# Patient Record
Sex: FEMALE | Race: BLACK OR AFRICAN AMERICAN | NOT HISPANIC OR LATINO
[De-identification: names, ages, dates, MRNs, and addresses within clinical notes are randomized per-mention and may not be internally consistent; named-entity substitution may affect disease eponyms.]

---

## 2023-11-17 ENCOUNTER — ASOB RESULT (OUTPATIENT)
Age: 33
End: 2023-11-17

## 2023-11-17 ENCOUNTER — LABORATORY RESULT (OUTPATIENT)
Age: 33
End: 2023-11-17

## 2023-11-17 ENCOUNTER — APPOINTMENT (OUTPATIENT)
Dept: ANTEPARTUM | Facility: CLINIC | Age: 33
End: 2023-11-17
Payer: COMMERCIAL

## 2023-11-17 PROCEDURE — 36416 COLLJ CAPILLARY BLOOD SPEC: CPT

## 2023-11-17 PROCEDURE — 76801 OB US < 14 WKS SINGLE FETUS: CPT | Mod: 59

## 2023-11-17 PROCEDURE — 76813 OB US NUCHAL MEAS 1 GEST: CPT

## 2024-01-12 ENCOUNTER — ASOB RESULT (OUTPATIENT)
Age: 34
End: 2024-01-12

## 2024-01-12 ENCOUNTER — APPOINTMENT (OUTPATIENT)
Dept: ANTEPARTUM | Facility: CLINIC | Age: 34
End: 2024-01-12
Payer: COMMERCIAL

## 2024-01-12 PROCEDURE — 76811 OB US DETAILED SNGL FETUS: CPT

## 2024-01-13 ENCOUNTER — TRANSCRIPTION ENCOUNTER (OUTPATIENT)
Age: 34
End: 2024-01-13

## 2024-01-18 ENCOUNTER — NON-APPOINTMENT (OUTPATIENT)
Age: 34
End: 2024-01-18

## 2024-05-03 ENCOUNTER — ASOB RESULT (OUTPATIENT)
Age: 34
End: 2024-05-03

## 2024-05-03 ENCOUNTER — APPOINTMENT (OUTPATIENT)
Dept: MATERNAL FETAL MEDICINE | Facility: CLINIC | Age: 34
End: 2024-05-03
Payer: COMMERCIAL

## 2024-05-03 PROCEDURE — G0109 DIAB MANAGE TRN IND/GROUP: CPT | Mod: 95

## 2024-05-03 RX ORDER — URINE ACETONE TEST STRIPS
STRIP MISCELLANEOUS
Qty: 1 | Refills: 2 | Status: ACTIVE | COMMUNITY
Start: 2024-05-03 | End: 1900-01-01

## 2024-05-13 ENCOUNTER — APPOINTMENT (OUTPATIENT)
Dept: MATERNAL FETAL MEDICINE | Facility: CLINIC | Age: 34
End: 2024-05-13
Payer: COMMERCIAL

## 2024-05-13 ENCOUNTER — ASOB RESULT (OUTPATIENT)
Age: 34
End: 2024-05-13

## 2024-05-13 PROCEDURE — G0108 DIAB MANAGE TRN  PER INDIV: CPT | Mod: 95

## 2024-05-19 ENCOUNTER — INPATIENT (INPATIENT)
Facility: HOSPITAL | Age: 34
LOS: 4 days | Discharge: HOME | End: 2024-05-24
Attending: STUDENT IN AN ORGANIZED HEALTH CARE EDUCATION/TRAINING PROGRAM | Admitting: STUDENT IN AN ORGANIZED HEALTH CARE EDUCATION/TRAINING PROGRAM

## 2024-05-19 VITALS — DIASTOLIC BLOOD PRESSURE: 81 MMHG | SYSTOLIC BLOOD PRESSURE: 135 MMHG | HEART RATE: 81 BPM

## 2024-05-19 DIAGNOSIS — O24.419 GESTATIONAL DIABETES MELLITUS IN PREGNANCY, UNSPECIFIED CONTROL: ICD-10-CM

## 2024-05-19 DIAGNOSIS — Z98.890 OTHER SPECIFIED POSTPROCEDURAL STATES: Chronic | ICD-10-CM

## 2024-05-19 LAB
BASOPHILS # BLD AUTO: 0.01 K/UL — SIGNIFICANT CHANGE UP (ref 0–0.2)
BASOPHILS NFR BLD AUTO: 0.1 % — SIGNIFICANT CHANGE UP (ref 0–2)
BLD GP AB SCN SERPL QL: NEGATIVE — SIGNIFICANT CHANGE UP
EOSINOPHIL # BLD AUTO: 0.08 K/UL — SIGNIFICANT CHANGE UP (ref 0–0.5)
EOSINOPHIL NFR BLD AUTO: 1.2 % — SIGNIFICANT CHANGE UP (ref 0–6)
GLUCOSE BLDC GLUCOMTR-MCNC: 92 MG/DL — SIGNIFICANT CHANGE UP (ref 70–99)
GLUCOSE BLDC GLUCOMTR-MCNC: 95 MG/DL — SIGNIFICANT CHANGE UP (ref 70–99)
HCT VFR BLD CALC: 37.8 % — SIGNIFICANT CHANGE UP (ref 34.5–45)
HGB BLD-MCNC: 13.1 G/DL — SIGNIFICANT CHANGE UP (ref 11.5–15.5)
IANC: 4.41 K/UL — SIGNIFICANT CHANGE UP (ref 1.8–7.4)
IMM GRANULOCYTES NFR BLD AUTO: 0.6 % — SIGNIFICANT CHANGE UP (ref 0–0.9)
LYMPHOCYTES # BLD AUTO: 1.61 K/UL — SIGNIFICANT CHANGE UP (ref 1–3.3)
LYMPHOCYTES # BLD AUTO: 23.9 % — SIGNIFICANT CHANGE UP (ref 13–44)
MCHC RBC-ENTMCNC: 30.1 PG — SIGNIFICANT CHANGE UP (ref 27–34)
MCHC RBC-ENTMCNC: 34.7 GM/DL — SIGNIFICANT CHANGE UP (ref 32–36)
MCV RBC AUTO: 86.9 FL — SIGNIFICANT CHANGE UP (ref 80–100)
MONOCYTES # BLD AUTO: 0.59 K/UL — SIGNIFICANT CHANGE UP (ref 0–0.9)
MONOCYTES NFR BLD AUTO: 8.8 % — SIGNIFICANT CHANGE UP (ref 2–14)
NEUTROPHILS # BLD AUTO: 4.41 K/UL — SIGNIFICANT CHANGE UP (ref 1.8–7.4)
NEUTROPHILS NFR BLD AUTO: 65.4 % — SIGNIFICANT CHANGE UP (ref 43–77)
NRBC # BLD: 0 /100 WBCS — SIGNIFICANT CHANGE UP (ref 0–0)
NRBC # FLD: 0.02 K/UL — HIGH (ref 0–0)
PLATELET # BLD AUTO: 222 K/UL — SIGNIFICANT CHANGE UP (ref 150–400)
RBC # BLD: 4.35 M/UL — SIGNIFICANT CHANGE UP (ref 3.8–5.2)
RBC # FLD: 13.4 % — SIGNIFICANT CHANGE UP (ref 10.3–14.5)
RH IG SCN BLD-IMP: POSITIVE — SIGNIFICANT CHANGE UP
RH IG SCN BLD-IMP: POSITIVE — SIGNIFICANT CHANGE UP
WBC # BLD: 6.74 K/UL — SIGNIFICANT CHANGE UP (ref 3.8–10.5)
WBC # FLD AUTO: 6.74 K/UL — SIGNIFICANT CHANGE UP (ref 3.8–10.5)

## 2024-05-19 RX ORDER — CHLORHEXIDINE GLUCONATE 213 G/1000ML
1 SOLUTION TOPICAL DAILY
Refills: 0 | Status: DISCONTINUED | OUTPATIENT
Start: 2024-05-19 | End: 2024-05-21

## 2024-05-19 RX ORDER — SODIUM CHLORIDE 9 MG/ML
1000 INJECTION INTRAMUSCULAR; INTRAVENOUS; SUBCUTANEOUS
Refills: 0 | Status: DISCONTINUED | OUTPATIENT
Start: 2024-05-19 | End: 2024-05-21

## 2024-05-19 RX ORDER — MORPHINE SULFATE 50 MG/1
4 CAPSULE, EXTENDED RELEASE ORAL ONCE
Refills: 0 | Status: DISCONTINUED | OUTPATIENT
Start: 2024-05-19 | End: 2024-05-20

## 2024-05-19 RX ORDER — ACETAMINOPHEN 500 MG
1000 TABLET ORAL ONCE
Refills: 0 | Status: COMPLETED | OUTPATIENT
Start: 2024-05-19 | End: 2024-05-19

## 2024-05-19 RX ORDER — OXYTOCIN 10 UNIT/ML
333.33 VIAL (ML) INJECTION
Qty: 20 | Refills: 0 | Status: DISCONTINUED | OUTPATIENT
Start: 2024-05-19 | End: 2024-05-21

## 2024-05-19 RX ORDER — SODIUM CHLORIDE 9 MG/ML
1000 INJECTION, SOLUTION INTRAVENOUS
Refills: 0 | Status: DISCONTINUED | OUTPATIENT
Start: 2024-05-19 | End: 2024-05-21

## 2024-05-19 RX ORDER — CITRIC ACID/SODIUM CITRATE 300-500 MG
15 SOLUTION, ORAL ORAL EVERY 6 HOURS
Refills: 0 | Status: DISCONTINUED | OUTPATIENT
Start: 2024-05-19 | End: 2024-05-21

## 2024-05-19 RX ADMIN — SODIUM CHLORIDE 125 MILLILITER(S): 9 INJECTION INTRAMUSCULAR; INTRAVENOUS; SUBCUTANEOUS at 20:23

## 2024-05-19 RX ADMIN — SODIUM CHLORIDE 125 MILLILITER(S): 9 INJECTION, SOLUTION INTRAVENOUS at 19:01

## 2024-05-19 RX ADMIN — CHLORHEXIDINE GLUCONATE 1 APPLICATION(S): 213 SOLUTION TOPICAL at 19:01

## 2024-05-19 RX ADMIN — Medication 1000 MILLIGRAM(S): at 22:05

## 2024-05-19 RX ADMIN — Medication 400 MILLIGRAM(S): at 21:33

## 2024-05-19 RX ADMIN — SODIUM CHLORIDE 125 MILLILITER(S): 9 INJECTION, SOLUTION INTRAVENOUS at 20:23

## 2024-05-19 NOTE — OB PROVIDER H&P - NSLOWPPHRISK_OBGYN_A_OB
No previous uterine incision/Gabriel Pregnancy/Less than or equal to 4 previous vaginal births/No known bleeding disorder/No history of postpartum hemorrhage

## 2024-05-19 NOTE — OB PROVIDER H&P - ASSESSMENT
A&P:   #Induction of labor:  - Admit to L&D  - Buccal cytotec / cervical balloon  - routine labs  - EFM/toco  - Clear liquids, IV hydration, rotating fluids, finger sticks per protocol  - Consents signed with the patient. Discussed induction/augmentation of labor, vaginal delivery, possible operative delivery with vacuum or foreceps, possible episiotomy, possible  section. Patient expressed understanding of all the risks and benefits, and signed consent form.  - Accepts blood  Fetal: cat 1 tracing, fetal status reassuring  GBS: neg  Analgesia: epiPRN      Discussed with Dr. Suyapa Adams PGY-1

## 2024-05-19 NOTE — OB RN PATIENT PROFILE - HEIGHT IN INCHES
Care Coordination Assessment    PCP: Anna Reyes    Referral Source:  ED/IP List      Clinical Data: Patient discharged from inpatient at [facility] 06/26/2017 for Delusional Disorder.  Patient was discharged home with instructions to follow up with PCP in 7 days.    Plan: I will forward to Denisa in clinical support to assess and assist with appropriate follow up.  We have not seen this patient in 2 years and should schedule a CPX as well           2

## 2024-05-19 NOTE — OB PROVIDER H&P - HISTORY OF PRESENT ILLNESS
R1 H&P    Pt is a 32y/o  at 39+0 admitted for IOL for GDMA1. +FM, -LOF, -VB, -ctx  Patient presented for scheduled IOL for GDMA1. Her sugars were well controlled with diet during the pregnancy, and the GDMA1 was diagnosed later in the pregnancy. She is otherwise uncomplicated. Today, she denies CP, SOB, N/V, RUQ pain, fevers, chills, dysuria, diarrhea.  GBS negative  EFW 3850    OBHx:   GynHx: denies h/o abnormal paps, STI's, cysts.  +Fibroids: DOMINIC IM 3h1u3cm, Posterior R IM 3.2d3i6ej, Fundal R IM 3m4y4zw  PMHx: denies  PSHx: bunionectomy  Med: PNV, ASA, Fe, Zyrtec  All: NKDA  SH: denies alcohol, tobacco, or drug use  Psych: denies h/o anxiety or depression  Accepts blood

## 2024-05-19 NOTE — OB RN PATIENT PROFILE - FUNCTIONAL ASSESSMENT - BASIC MOBILITY 6.
4-calculated by average/Not able to assess (calculate score using [facility] averaging method)

## 2024-05-19 NOTE — OB PROVIDER H&P - NSHPPHYSICALEXAM_GEN_ALL_CORE
EFH: 130/mod/+accels, no decels  [place]: uterine irritability  VE: 0.5/0/-3  TAUS: vertex    General: comfortable, NAD  Pulm: unlabored breathing  Abd: gravid, soft, nontender

## 2024-05-20 ENCOUNTER — TRANSCRIPTION ENCOUNTER (OUTPATIENT)
Age: 34
End: 2024-05-20

## 2024-05-20 LAB
ALBUMIN SERPL ELPH-MCNC: 4.3 G/DL — SIGNIFICANT CHANGE UP (ref 3.3–5)
ALP SERPL-CCNC: 147 U/L — HIGH (ref 40–120)
ALT FLD-CCNC: 36 U/L — HIGH (ref 4–33)
ANION GAP SERPL CALC-SCNC: 18 MMOL/L — HIGH (ref 7–14)
APPEARANCE UR: CLEAR — SIGNIFICANT CHANGE UP
AST SERPL-CCNC: 30 U/L — SIGNIFICANT CHANGE UP (ref 4–32)
BACTERIA # UR AUTO: NEGATIVE /HPF — SIGNIFICANT CHANGE UP
BASOPHILS # BLD AUTO: 0.01 K/UL — SIGNIFICANT CHANGE UP (ref 0–0.2)
BASOPHILS NFR BLD AUTO: 0.1 % — SIGNIFICANT CHANGE UP (ref 0–2)
BILIRUB SERPL-MCNC: 0.7 MG/DL — SIGNIFICANT CHANGE UP (ref 0.2–1.2)
BILIRUB UR-MCNC: NEGATIVE — SIGNIFICANT CHANGE UP
BUN SERPL-MCNC: 6 MG/DL — LOW (ref 7–23)
CALCIUM SERPL-MCNC: 10.2 MG/DL — SIGNIFICANT CHANGE UP (ref 8.4–10.5)
CAST: 0 /LPF — SIGNIFICANT CHANGE UP (ref 0–4)
CHLORIDE SERPL-SCNC: 103 MMOL/L — SIGNIFICANT CHANGE UP (ref 98–107)
CO2 SERPL-SCNC: 17 MMOL/L — LOW (ref 22–31)
COLOR SPEC: YELLOW — SIGNIFICANT CHANGE UP
CREAT ?TM UR-MCNC: 71 MG/DL — SIGNIFICANT CHANGE UP
CREAT SERPL-MCNC: 0.76 MG/DL — SIGNIFICANT CHANGE UP (ref 0.5–1.3)
DIFF PNL FLD: ABNORMAL
EGFR: 107 ML/MIN/1.73M2 — SIGNIFICANT CHANGE UP
EOSINOPHIL # BLD AUTO: 0.01 K/UL — SIGNIFICANT CHANGE UP (ref 0–0.5)
EOSINOPHIL NFR BLD AUTO: 0.1 % — SIGNIFICANT CHANGE UP (ref 0–6)
GLUCOSE BLDC GLUCOMTR-MCNC: 100 MG/DL — HIGH (ref 70–99)
GLUCOSE BLDC GLUCOMTR-MCNC: 72 MG/DL — SIGNIFICANT CHANGE UP (ref 70–99)
GLUCOSE BLDC GLUCOMTR-MCNC: 74 MG/DL — SIGNIFICANT CHANGE UP (ref 70–99)
GLUCOSE BLDC GLUCOMTR-MCNC: 83 MG/DL — SIGNIFICANT CHANGE UP (ref 70–99)
GLUCOSE BLDC GLUCOMTR-MCNC: 84 MG/DL — SIGNIFICANT CHANGE UP (ref 70–99)
GLUCOSE BLDC GLUCOMTR-MCNC: 90 MG/DL — SIGNIFICANT CHANGE UP (ref 70–99)
GLUCOSE SERPL-MCNC: 76 MG/DL — SIGNIFICANT CHANGE UP (ref 70–99)
GLUCOSE UR QL: NEGATIVE MG/DL — SIGNIFICANT CHANGE UP
HCT VFR BLD CALC: 50.5 % — HIGH (ref 34.5–45)
HGB BLD-MCNC: 17 G/DL — HIGH (ref 11.5–15.5)
IANC: 7.94 K/UL — HIGH (ref 1.8–7.4)
IMM GRANULOCYTES NFR BLD AUTO: 0.4 % — SIGNIFICANT CHANGE UP (ref 0–0.9)
KETONES UR-MCNC: 15 MG/DL
LDH SERPL L TO P-CCNC: 251 U/L — HIGH (ref 135–225)
LEUKOCYTE ESTERASE UR-ACNC: NEGATIVE — SIGNIFICANT CHANGE UP
LYMPHOCYTES # BLD AUTO: 1.23 K/UL — SIGNIFICANT CHANGE UP (ref 1–3.3)
LYMPHOCYTES # BLD AUTO: 12.6 % — LOW (ref 13–44)
MCHC RBC-ENTMCNC: 29.8 PG — SIGNIFICANT CHANGE UP (ref 27–34)
MCHC RBC-ENTMCNC: 33.7 GM/DL — SIGNIFICANT CHANGE UP (ref 32–36)
MCV RBC AUTO: 88.4 FL — SIGNIFICANT CHANGE UP (ref 80–100)
MONOCYTES # BLD AUTO: 0.56 K/UL — SIGNIFICANT CHANGE UP (ref 0–0.9)
MONOCYTES NFR BLD AUTO: 5.7 % — SIGNIFICANT CHANGE UP (ref 2–14)
NEUTROPHILS # BLD AUTO: 7.94 K/UL — HIGH (ref 1.8–7.4)
NEUTROPHILS NFR BLD AUTO: 81.1 % — HIGH (ref 43–77)
NITRITE UR-MCNC: NEGATIVE — SIGNIFICANT CHANGE UP
NRBC # BLD: 0 /100 WBCS — SIGNIFICANT CHANGE UP (ref 0–0)
NRBC # FLD: 0 K/UL — SIGNIFICANT CHANGE UP (ref 0–0)
PH UR: 7 — SIGNIFICANT CHANGE UP (ref 5–8)
PLATELET # BLD AUTO: 225 K/UL — SIGNIFICANT CHANGE UP (ref 150–400)
POTASSIUM SERPL-MCNC: 4.1 MMOL/L — SIGNIFICANT CHANGE UP (ref 3.5–5.3)
POTASSIUM SERPL-SCNC: 4.1 MMOL/L — SIGNIFICANT CHANGE UP (ref 3.5–5.3)
PROT ?TM UR-MCNC: 13 MG/DL — SIGNIFICANT CHANGE UP
PROT SERPL-MCNC: 8.2 G/DL — SIGNIFICANT CHANGE UP (ref 6–8.3)
PROT UR-MCNC: NEGATIVE MG/DL — SIGNIFICANT CHANGE UP
PROT/CREAT UR-RTO: 0.2 RATIO — SIGNIFICANT CHANGE UP (ref 0–0.2)
RBC # BLD: 5.71 M/UL — HIGH (ref 3.8–5.2)
RBC # FLD: 14.2 % — SIGNIFICANT CHANGE UP (ref 10.3–14.5)
RBC CASTS # UR COMP ASSIST: 16 /HPF — HIGH (ref 0–4)
SODIUM SERPL-SCNC: 138 MMOL/L — SIGNIFICANT CHANGE UP (ref 135–145)
SP GR SPEC: 1.01 — SIGNIFICANT CHANGE UP (ref 1–1.03)
SQUAMOUS # UR AUTO: 1 /HPF — SIGNIFICANT CHANGE UP (ref 0–5)
URATE SERPL-MCNC: 4.1 MG/DL — SIGNIFICANT CHANGE UP (ref 2.5–7)
UROBILINOGEN FLD QL: 0.2 MG/DL — SIGNIFICANT CHANGE UP (ref 0.2–1)
WBC # BLD: 9.79 K/UL — SIGNIFICANT CHANGE UP (ref 3.8–10.5)
WBC # FLD AUTO: 9.79 K/UL — SIGNIFICANT CHANGE UP (ref 3.8–10.5)
WBC UR QL: 1 /HPF — SIGNIFICANT CHANGE UP (ref 0–5)

## 2024-05-20 RX ORDER — MORPHINE SULFATE 50 MG/1
4 CAPSULE, EXTENDED RELEASE ORAL ONCE
Refills: 0 | Status: DISCONTINUED | OUTPATIENT
Start: 2024-05-20 | End: 2024-05-20

## 2024-05-20 RX ORDER — SODIUM CHLORIDE 9 MG/ML
1000 INJECTION INTRAMUSCULAR; INTRAVENOUS; SUBCUTANEOUS
Refills: 0 | Status: DISCONTINUED | OUTPATIENT
Start: 2024-05-20 | End: 2024-05-21

## 2024-05-20 RX ORDER — DIPHENHYDRAMINE HCL 50 MG
25 CAPSULE ORAL ONCE
Refills: 0 | Status: COMPLETED | OUTPATIENT
Start: 2024-05-20 | End: 2024-05-20

## 2024-05-20 RX ORDER — MORPHINE SULFATE 50 MG/1
4 CAPSULE, EXTENDED RELEASE ORAL ONCE
Refills: 0 | Status: DISCONTINUED | OUTPATIENT
Start: 2024-05-20 | End: 2024-05-21

## 2024-05-20 RX ORDER — SODIUM CHLORIDE 9 MG/ML
500 INJECTION INTRAMUSCULAR; INTRAVENOUS; SUBCUTANEOUS ONCE
Refills: 0 | Status: COMPLETED | OUTPATIENT
Start: 2024-05-20 | End: 2024-05-20

## 2024-05-20 RX ORDER — OXYTOCIN 10 UNIT/ML
VIAL (ML) INJECTION
Qty: 30 | Refills: 0 | Status: DISCONTINUED | OUTPATIENT
Start: 2024-05-20 | End: 2024-05-21

## 2024-05-20 RX ORDER — ACETAMINOPHEN 500 MG
1000 TABLET ORAL ONCE
Refills: 0 | Status: COMPLETED | OUTPATIENT
Start: 2024-05-20 | End: 2024-05-20

## 2024-05-20 RX ADMIN — MORPHINE SULFATE 4 MILLIGRAM(S): 50 CAPSULE, EXTENDED RELEASE ORAL at 16:25

## 2024-05-20 RX ADMIN — Medication 25 MILLIGRAM(S): at 19:53

## 2024-05-20 RX ADMIN — Medication 400 MILLIGRAM(S): at 10:00

## 2024-05-20 RX ADMIN — MORPHINE SULFATE 4 MILLIGRAM(S): 50 CAPSULE, EXTENDED RELEASE ORAL at 07:15

## 2024-05-20 RX ADMIN — MORPHINE SULFATE 4 MILLIGRAM(S): 50 CAPSULE, EXTENDED RELEASE ORAL at 18:25

## 2024-05-20 RX ADMIN — Medication 1000 MILLIGRAM(S): at 12:20

## 2024-05-20 RX ADMIN — SODIUM CHLORIDE 125 MILLILITER(S): 9 INJECTION INTRAMUSCULAR; INTRAVENOUS; SUBCUTANEOUS at 20:43

## 2024-05-20 RX ADMIN — Medication 30 MILLILITER(S): at 00:28

## 2024-05-20 RX ADMIN — CHLORHEXIDINE GLUCONATE 1 APPLICATION(S): 213 SOLUTION TOPICAL at 17:23

## 2024-05-20 RX ADMIN — MORPHINE SULFATE 4 MILLIGRAM(S): 50 CAPSULE, EXTENDED RELEASE ORAL at 00:21

## 2024-05-20 RX ADMIN — MORPHINE SULFATE 4 MILLIGRAM(S): 50 CAPSULE, EXTENDED RELEASE ORAL at 00:29

## 2024-05-20 RX ADMIN — Medication 0.25 MILLIGRAM(S): at 20:00

## 2024-05-20 RX ADMIN — SODIUM CHLORIDE 1000 MILLILITER(S): 9 INJECTION INTRAMUSCULAR; INTRAVENOUS; SUBCUTANEOUS at 19:58

## 2024-05-20 NOTE — OB PROVIDER LABOR PROGRESS NOTE - ASSESSMENT
CNM OB Progress Note    Patient seen and evaluated at bedside.  Reports painful tolerable contractions. declines pain intervention at this time. Although Morphine was ordered earlier today, patient did not received meds, she declined, instead requested IV Tylenol for pain.    T(C): 36.8 (05-20-24 @ 14:00), Max: 36.8 (05-20-24 @ 10:00)  HR: 71 (05-20-24 @ 14:09) (66 - 93)  BP: 124/88 (05-20-24 @ 14:09) (98/55 - 135/81)  RR: 20 (05-20-24 @ 14:00) (16 - 20)  SpO2: 93% (05-20-24 @ 08:02) (81% - 100%)    SVE: 2/50/-3      -Labor: cat 1     -Analgesia: n/a  -Induction with: CB and BC    BC #5 given at 12pm, continue with BC and CB  CB deflated for VE, 2/50/-3  CB reinflated, to remain in place x 24hrs, placed at 930pm, patient verbalizes understanding and agrees to plan  labor ball present in room, patient encouraged to use  Reposition PRN     -Continue to monitor with EFM & TOCO wireless system  -Recheck patient in 2-4 hours or PRN    Discussed with MD Colten Decker  
CNM OB Progress Note    Patient seen and evaluated at bedside.  Reports painful contractions.     T(C): 36.4 (05-20-24 @ 05:50), Max: 36.5 (05-19-24 @ 18:18)  HR: 80 (05-20-24 @ 07:31) (66 - 93)  BP: 128/77 (05-20-24 @ 07:27) (98/55 - 135/81)  RR: 16 (05-20-24 @ 05:50) (16 - 18)  SpO2: 94% (05-20-24 @ 07:31) (81% - 100%)    SVE: 1.5/50/-3      -Labor: cat 1 tracing  -Fetus: EFW 7#15  -GBS neg  -Analgesia: n/a  -Induction with: CB and buccal cytotec    Last dose buccal cytotec given at 2am earlier today  consider changing to po cytotec, as she is frances frequently  reposition PRN  Morphine ordered now       -Continue to monitor with EFM & TOCO wireless system in place now  -Recheck patient in 2-4 hours or PRN    Discussed with MD Colten Decker  
 @39.1wks A1  IUPC placed without incident  Amnioinfusion started  IV fluids opened  MD Monahan at bedside  Patient placed on all fours  Decision made to administer terbutaline secondary to tachysystole  Cont intrauterine resuscitation  Cont fetal/maternal monitoring  Consider pitocin when FHR tracing allows/according to [facility] policy    ARYAN Ferguson NP  
32yo , IUP 39.1 weeks    Vital Signs Last 24 Hrs  T(C): 36.8 (20 May 2024 14:00), Max: 36.8 (20 May 2024 10:00)  T(F): 98.24 (20 May 2024 14:00), Max: 98.24 (20 May 2024 10:00)  HR: 75 (20 May 2024 16:56) (66 - 93)  BP: 129/74 (20 May 2024 16:40) (98/55 - 135/81)  RR: 20 (20 May 2024 14:00) (16 - 20)  SpO2: 97% (20 May 2024 16:56) (81% - 100%)    Parameters below as of 19 May 2024 18:18  Patient On (Oxygen Delivery Method): room air    Cervical ripening balloon out  SROM - clear fluid  VE: 6/80/-2    Active labor  Pt requesting epidural    Plan:  Continue EFM and toco  Continue IVF  Epidural requested  Anticipate     Dr. Abel updated    Fwbrown, CNM
A/P:   - Labor:  IOL for A1. Currently on buccal cytotec. Cervical balloon placed 60/60cc. Continue cytotec  - Fetus: cat 1  - GBS: negative  - Pain: IV Tylenol, declining epidural at this time    Gertrude Adams, PGY-1  as per plan from Dr. Dale

## 2024-05-20 NOTE — OB PROVIDER LABOR PROGRESS NOTE - NS_OBIHIFHRDETAILS_OBGYN_ALL_OB_FT
140/mod/+accels, no decels
135 mod dayo/+accels/+variable decelerations
FHT CAT I   bpm  moderate variability  + accels  no decelerations

## 2024-05-20 NOTE — OB PROVIDER LABOR PROGRESS NOTE - NS_SUBJECTIVE/OBJECTIVE_OBGYN_ALL_OB_FT
Patient seen and examined secondary to recurrent variable decelerations. Effective epidural in situ. Patient comfortable without complaints.
34yo , IUP 39.1 weeks  A1, fibroids, GBS neg    Called to room for cervical ripening balloon out.
called to assess patient for pain intervention
R1 OB Labor Note    S: Patient seen and examined at bedside. Cervical balloon placed with no issues.    T(C): 36.4 (05-19-24 @ 19:35), Max: 36.5 (05-19-24 @ 18:18)  HR: 87 (05-19-24 @ 19:35) (81 - 88)  BP: 125/83 (05-19-24 @ 19:35) (125/83 - 135/81)  RR: 16 (05-19-24 @ 19:35) (16 - 16)  SpO2: 98% (05-19-24 @ 19:35) (98% - 99%)

## 2024-05-20 NOTE — OB PROVIDER H&P - NS_PRENATALLABSOURCEGBS36PN_OBGYN_ALL_OB
[de-identified] : rt wrist pain began today after dead lifting. pain in mid r wrist. difficulty fulling opening and closing the hand. pain extending the wrist and gripping.  pain level 8 [] : no negative

## 2024-05-21 LAB — T PALLIDUM AB TITR SER: NEGATIVE — SIGNIFICANT CHANGE UP

## 2024-05-21 DEVICE — SURGICEL POWDER 3 GRAMS: Type: IMPLANTABLE DEVICE | Status: FUNCTIONAL

## 2024-05-21 RX ORDER — OXYCODONE HYDROCHLORIDE 5 MG/1
5 TABLET ORAL
Refills: 0 | Status: COMPLETED | OUTPATIENT
Start: 2024-05-21 | End: 2024-05-28

## 2024-05-21 RX ORDER — NALBUPHINE HYDROCHLORIDE 10 MG/ML
2 INJECTION, SOLUTION INTRAMUSCULAR; INTRAVENOUS; SUBCUTANEOUS ONCE
Refills: 0 | Status: COMPLETED | OUTPATIENT
Start: 2024-05-21 | End: 2024-05-22

## 2024-05-21 RX ORDER — OXYCODONE HYDROCHLORIDE 5 MG/1
5 TABLET ORAL
Refills: 0 | Status: DISCONTINUED | OUTPATIENT
Start: 2024-05-21 | End: 2024-05-21

## 2024-05-21 RX ORDER — HEPARIN SODIUM 5000 [USP'U]/ML
5000 INJECTION INTRAVENOUS; SUBCUTANEOUS EVERY 12 HOURS
Refills: 0 | Status: DISCONTINUED | OUTPATIENT
Start: 2024-05-21 | End: 2024-05-24

## 2024-05-21 RX ORDER — ACETAMINOPHEN 500 MG
975 TABLET ORAL
Refills: 0 | Status: DISCONTINUED | OUTPATIENT
Start: 2024-05-21 | End: 2024-05-24

## 2024-05-21 RX ORDER — SODIUM CHLORIDE 9 MG/ML
1000 INJECTION, SOLUTION INTRAVENOUS
Refills: 0 | Status: DISCONTINUED | OUTPATIENT
Start: 2024-05-21 | End: 2024-05-21

## 2024-05-21 RX ORDER — SENNA PLUS 8.6 MG/1
2 TABLET ORAL AT BEDTIME
Refills: 0 | Status: DISCONTINUED | OUTPATIENT
Start: 2024-05-21 | End: 2024-05-24

## 2024-05-21 RX ORDER — FERROUS SULFATE 325(65) MG
325 TABLET ORAL DAILY
Refills: 0 | Status: DISCONTINUED | OUTPATIENT
Start: 2024-05-21 | End: 2024-05-24

## 2024-05-21 RX ORDER — DIPHENHYDRAMINE HCL 50 MG
25 CAPSULE ORAL EVERY 6 HOURS
Refills: 0 | Status: COMPLETED | OUTPATIENT
Start: 2024-05-21 | End: 2025-04-19

## 2024-05-21 RX ORDER — NALOXONE HYDROCHLORIDE 4 MG/.1ML
0.1 SPRAY NASAL
Refills: 0 | Status: DISCONTINUED | OUTPATIENT
Start: 2024-05-21 | End: 2024-05-22

## 2024-05-21 RX ORDER — IBUPROFEN 200 MG
600 TABLET ORAL EVERY 6 HOURS
Refills: 0 | Status: COMPLETED | OUTPATIENT
Start: 2024-05-21 | End: 2025-04-19

## 2024-05-21 RX ORDER — BUTORPHANOL TARTRATE 2 MG/ML
0.25 INJECTION, SOLUTION INTRAMUSCULAR; INTRAVENOUS EVERY 6 HOURS
Refills: 0 | Status: DISCONTINUED | OUTPATIENT
Start: 2024-05-21 | End: 2024-05-21

## 2024-05-21 RX ORDER — ONDANSETRON 8 MG/1
4 TABLET, FILM COATED ORAL EVERY 6 HOURS
Refills: 0 | Status: DISCONTINUED | OUTPATIENT
Start: 2024-05-21 | End: 2024-05-22

## 2024-05-21 RX ORDER — LANOLIN
1 OINTMENT (GRAM) TOPICAL EVERY 6 HOURS
Refills: 0 | Status: DISCONTINUED | OUTPATIENT
Start: 2024-05-21 | End: 2024-05-24

## 2024-05-21 RX ORDER — SIMETHICONE 80 MG/1
80 TABLET, CHEWABLE ORAL EVERY 4 HOURS
Refills: 0 | Status: DISCONTINUED | OUTPATIENT
Start: 2024-05-21 | End: 2024-05-24

## 2024-05-21 RX ORDER — HYDROMORPHONE HYDROCHLORIDE 2 MG/ML
0.5 INJECTION INTRAMUSCULAR; INTRAVENOUS; SUBCUTANEOUS
Refills: 0 | Status: DISCONTINUED | OUTPATIENT
Start: 2024-05-21 | End: 2024-05-22

## 2024-05-21 RX ORDER — FAMOTIDINE 10 MG/ML
20 INJECTION INTRAVENOUS ONCE
Refills: 0 | Status: DISCONTINUED | OUTPATIENT
Start: 2024-05-21 | End: 2024-05-21

## 2024-05-21 RX ORDER — KETOROLAC TROMETHAMINE 30 MG/ML
30 SYRINGE (ML) INJECTION EVERY 6 HOURS
Refills: 0 | Status: DISCONTINUED | OUTPATIENT
Start: 2024-05-21 | End: 2024-05-22

## 2024-05-21 RX ORDER — CITRIC ACID/SODIUM CITRATE 300-500 MG
30 SOLUTION, ORAL ORAL ONCE
Refills: 0 | Status: DISCONTINUED | OUTPATIENT
Start: 2024-05-21 | End: 2024-05-21

## 2024-05-21 RX ORDER — MAGNESIUM HYDROXIDE 400 MG/1
30 TABLET, CHEWABLE ORAL
Refills: 0 | Status: DISCONTINUED | OUTPATIENT
Start: 2024-05-21 | End: 2024-05-24

## 2024-05-21 RX ORDER — OXYTOCIN 10 UNIT/ML
333.33 VIAL (ML) INJECTION
Qty: 20 | Refills: 0 | Status: DISCONTINUED | OUTPATIENT
Start: 2024-05-21 | End: 2024-05-21

## 2024-05-21 RX ORDER — OXYCODONE HYDROCHLORIDE 5 MG/1
10 TABLET ORAL
Refills: 0 | Status: DISCONTINUED | OUTPATIENT
Start: 2024-05-21 | End: 2024-05-21

## 2024-05-21 RX ORDER — ONDANSETRON 8 MG/1
4 TABLET, FILM COATED ORAL ONCE
Refills: 0 | Status: DISCONTINUED | OUTPATIENT
Start: 2024-05-21 | End: 2024-05-22

## 2024-05-21 RX ORDER — MORPHINE SULFATE 50 MG/1
2 CAPSULE, EXTENDED RELEASE ORAL ONCE
Refills: 0 | Status: DISCONTINUED | OUTPATIENT
Start: 2024-05-21 | End: 2024-05-22

## 2024-05-21 RX ORDER — TETANUS TOXOID, REDUCED DIPHTHERIA TOXOID AND ACELLULAR PERTUSSIS VACCINE, ADSORBED 5; 2.5; 8; 8; 2.5 [IU]/.5ML; [IU]/.5ML; UG/.5ML; UG/.5ML; UG/.5ML
0.5 SUSPENSION INTRAMUSCULAR ONCE
Refills: 0 | Status: DISCONTINUED | OUTPATIENT
Start: 2024-05-21 | End: 2024-05-24

## 2024-05-21 RX ORDER — DIPHENHYDRAMINE HCL 50 MG
25 CAPSULE ORAL EVERY 6 HOURS
Refills: 0 | Status: DISCONTINUED | OUTPATIENT
Start: 2024-05-21 | End: 2024-05-24

## 2024-05-21 RX ORDER — DEXAMETHASONE 0.5 MG/5ML
4 ELIXIR ORAL EVERY 6 HOURS
Refills: 0 | Status: DISCONTINUED | OUTPATIENT
Start: 2024-05-21 | End: 2024-05-22

## 2024-05-21 RX ORDER — OXYCODONE HYDROCHLORIDE 5 MG/1
5 TABLET ORAL ONCE
Refills: 0 | Status: DISCONTINUED | OUTPATIENT
Start: 2024-05-21 | End: 2024-05-24

## 2024-05-21 RX ADMIN — FAMOTIDINE 20 MILLIGRAM(S): 10 INJECTION INTRAVENOUS at 00:28

## 2024-05-21 RX ADMIN — Medication 975 MILLIGRAM(S): at 17:33

## 2024-05-21 RX ADMIN — Medication 30 MILLIGRAM(S): at 14:27

## 2024-05-21 RX ADMIN — Medication 30 MILLIGRAM(S): at 08:59

## 2024-05-21 RX ADMIN — Medication 1000 MILLIUNIT(S)/MIN: at 04:31

## 2024-05-21 RX ADMIN — BUTORPHANOL TARTRATE 0.25 MILLIGRAM(S): 2 INJECTION, SOLUTION INTRAMUSCULAR; INTRAVENOUS at 08:02

## 2024-05-21 RX ADMIN — Medication 975 MILLIGRAM(S): at 23:44

## 2024-05-21 RX ADMIN — Medication 975 MILLIGRAM(S): at 18:00

## 2024-05-21 RX ADMIN — HEPARIN SODIUM 5000 UNIT(S): 5000 INJECTION INTRAVENOUS; SUBCUTANEOUS at 17:33

## 2024-05-21 RX ADMIN — Medication 30 MILLIGRAM(S): at 22:02

## 2024-05-21 RX ADMIN — Medication 30 MILLIGRAM(S): at 09:30

## 2024-05-21 RX ADMIN — Medication 25 MILLIGRAM(S): at 23:45

## 2024-05-21 RX ADMIN — Medication 975 MILLIGRAM(S): at 07:00

## 2024-05-21 RX ADMIN — Medication 975 MILLIGRAM(S): at 12:06

## 2024-05-21 RX ADMIN — Medication 25 MILLIGRAM(S): at 10:41

## 2024-05-21 RX ADMIN — HEPARIN SODIUM 5000 UNIT(S): 5000 INJECTION INTRAVENOUS; SUBCUTANEOUS at 07:00

## 2024-05-21 RX ADMIN — BUTORPHANOL TARTRATE 0.25 MILLIGRAM(S): 2 INJECTION, SOLUTION INTRAMUSCULAR; INTRAVENOUS at 18:34

## 2024-05-21 RX ADMIN — Medication 975 MILLIGRAM(S): at 12:35

## 2024-05-21 RX ADMIN — SODIUM CHLORIDE 75 MILLILITER(S): 9 INJECTION, SOLUTION INTRAVENOUS at 04:30

## 2024-05-21 RX ADMIN — Medication 30 MILLIGRAM(S): at 15:00

## 2024-05-21 RX ADMIN — Medication 30 MILLIGRAM(S): at 21:07

## 2024-05-21 NOTE — OB RN DELIVERY SUMMARY - NSSELHIDDEN_OBGYN_ALL_OB_FT
[NS_DeliveryAttending1_OBGYN_ALL_OB_FT:[email or web address]=],[NS_DeliveryAssist1_OBGYN_ALL_OB_FT:[id number]=],[NS_DeliveryRN_OBGYN_ALL_OB_FT:[id number]=]

## 2024-05-21 NOTE — OB POSTPARTUM EVENT NOTE - NS_EVENTSUMMARY1_OBGYN_ALL_OB_FT
Pt states she has had full body itching since receiving epidural for .   Pt states she took benadryl and stadol with little relief  Pt denies any history of allergic reactions  Pt otherwise feels well and denies any other symptoms

## 2024-05-21 NOTE — OB RN DELIVERY SUMMARY - NS_SEPSISRSKCALC_OBGYN_ALL_OB_FT
EOS calculated successfully. EOS Risk Factor: 0.5/1000 live births ([facility] national incidence); GA=39w2d; Temp=98.6; ROM=9.117; GBS='Negative'; Antibiotics='No antibiotics or any antibiotics < 2 hrs prior to birth'

## 2024-05-21 NOTE — OB RN INTRAOPERATIVE NOTE - NSSELHIDDEN_OBGYN_ALL_OB_FT
[NS_DeliveryAttending1_OBGYN_ALL_OB_FT:[email or web address]=],[NS_DeliveryAssist1_OBGYN_ALL_OB_FT:[id number]=],[NS_DeliveryRN_OBGYN_ALL_OB_FT:[id number]=]

## 2024-05-21 NOTE — OB NEONATOLOGY/PEDIATRICIAN DELIVERY SUMMARY - NSPEDSNEONOTESA_OBGYN_ALL_OB_FT
Called to OR for NRFHR Cat II tracing. Baby girl born at 39+2 wks via primary unscheduled CS to a 32 y/o  O+ blood type mother. Maternal history of GDMA1 and gHTN. Mom on PNV, Fe, ASA and Zyrtec PRN during the pregnancy. PNL neg/nr/immune/-, Hep C NR, GBS - on . SROM at 1537 with clear fluids. Baby emerged with no tone, absent cry and respiratory effort. Delayed cord clamping not done. Was brought immediately over to the warmer and dried, stimulated and deep suctioning still with no tone and cry at 1 MOL. PPV started and NICU resuscitation called. PPV continued for about 1.5 minutes at max settings of 20/100% FiO2 with good chest rise until a cry and spontaneous respirations were noted. CPAP continued for an additional minute with good O2 sats in the 90s, so was trialed to room air. APGARS of 2/8. Baby stable for transfer to NBN. Mom would like to breastfeed, and consents to the Hep B vaccine. Highest maternal temp. was 37.0 C, EOS 0.13. NICU attending Dr. Mcfarland present for the delivery.     BW: 3520 g (AGA)    Physical Exam:  Gen: NAD, +grimace  HEENT: anterior fontanel open soft and flat, no cleft lip/palate, ears normal set, no ear pits or tags. no lesions in mouth/throat, nares clinically patent  Resp: no increased work of breathing, good air entry b/l, clear to auscultation bilaterally  Cardio: Normal S1/S2, regular rate and rhythm, no murmurs, rubs or gallops  Abd: soft, non tender, non distended, + bowel sounds, umbilical cord with 3 vessels  Neuro: +grasp/suck/rk, normal tone  Extremities: negative stoddard and ortolani, moving all extremities, full range of motion x 4, no crepitus  Skin: pink, warm  Genitals: Normal female anatomy, Chevy 1, anus patent

## 2024-05-21 NOTE — CHART NOTE - NSCHARTNOTEFT_GEN_A_CORE
GHTN Diagnosis       Diagnosis reviewed with patient, partner present in room for discussion, patient verbalizes understanding, all question and concerns addressed.    24hr BP range as below    BP x 24 hours: BP:  (105/60 - 161/93)    Asymptomatic, denies s/s of PEC at this time        Patient met diagnosis by: 2 or more elevated BP's as listed below:     5/20/24 BLOOD PRESSURES REVIEWED::    @ 1718-142/87    @ 1750--149/70    @ 2257--148/96    @2359--141/94    --------------------------------------------------------------------------------      hellp LABS REVIEWED: AST 30,  ALT 36    PC RATIO 0.2    ------------------------------------------------------------------------------------------    T(C): 36.6 (05-21-24 @ 11:10), Max: 37.0 (05-20-24 @ 23:00)  T(F): 97.8 (05-21-24 @ 11:10), Max: 98.6 (05-20-24 @ 23:00)  HR: 70 (05-21-24 @ 05:08) (65 - 129)  BP: 134/76 (05-21-24 @ 05:08) (105/60 - 161/93)  RR: 18 (05-21-24 @ 11:10) (16 - 28)  SpO2: 98% (05-21-24 @ 11:10) (90% - 100%)  Wt(kg): --        LABS:  cret                        17.0   9.79  )-----------( 225      ( 20 May 2024 21:30 )             50.5     05-20    138  |  103  |  6<L>  ----------------------------<  76  4.1   |  17<L>  |  0.76    Ca    10.2      20 May 2024 21:30    TPro  8.2  /  Alb  4.3  /  TBili  0.7  /  DBili  x   /  AST  30  /  ALT  36<H>  /  AlkPhos  147<H>  05-20      --------------------------------------------------------------------------        -PEC s/s reviewed  -PEC precautions reviewed  -PEC educational materials provided    Blood pressure Rx sent to home pharmacy thru [facility]  Patient instructed to take BP TID and parameters reviewed  Patient to keep BP log and bring to appt at [facility] office  Patient to follow up @ [facility] office in 5-7 days for PP BP check    ----------------------------------------------------------------------------------------------      PLAN    1. REVIEWED GHTN DX WITH PATIENT--ILANA OLSEN REVIEWED WITH PATIENT    2. REPEAT HELLP LABS ORDERED FOR TOMORROW AM    3. CONTINUE TO MONITOR        Discussed with Primary PP RN     Discussed with PP Resident MD Dominique    Discussed with MD Basilio Decker
pt was evaluated at bedside for category 2 tracing. Patient was in  Spiderman position   ve: 8/80/0   terb x1 was given. Second in total   patient was counseled for  section due to category 2 tracing remote from delivery   informed consent signed   L/D team aware
 @39.1wks A1 IOL  Patient meeting criteria for gHTN  Patient seen at bedside to discuss dx    VS  T(C): 36.7 (24 @ 21:00)  HR: 89 (24 @ 22:56)  BP: 144/89 (24 @ 22:43)  RR: 16 (24 @ 21:00)  SpO2: 100% (24 @ 22:41)      At this time, patient denies headache, blurry vision, epigastric pain, n/v  Patient informed that she is at increased risk for developing PEC/sPEC. Discussed the need for antihypertensive meds and/or Mag ppx if progressed to sPEC.  Patient informed that she is at increased risk for developing hypertensive dx and/or PEC with future pregnancies  In addition, patient informed she is at increased risk for cardiovascular disease or hypertension outside of pregnancy  Patient verbalizes understanding. All questions and concerns addressed at this time.    HELLP labs wnl  PC 0.2  BP monitoring    dw MD Hero Ferguson NP

## 2024-05-21 NOTE — DISCHARGE NOTE OB - MEDICATION SUMMARY - MEDICATIONS TO TAKE
I will START or STAY ON the medications listed below when I get home from the hospital:    ibuprofen 600 mg oral tablet  -- 1 tab(s) by mouth every 6 hours as needed for  moderate pain  -- Indication: For pain    acetaminophen 325 mg oral tablet  -- 3 tab(s) by mouth every 6 hours as needed for  moderate pain  -- Indication: For pain    Prenatal Multivitamins with Folic Acid 1 mg oral tablet  -- 1 tab(s) by mouth once a day  -- Indication: For postpartum    ferrous sulfate 325 mg (65 mg elemental iron) oral tablet  -- 1 tab(s) by mouth once a day  -- Indication: For anemia

## 2024-05-21 NOTE — LACTATION INITIAL EVALUATION - DIABETES
no PRINCIPAL DISCHARGE DIAGNOSIS  Diagnosis: Cerebrovascular accident (CVA)  Assessment and Plan of Treatment:       SECONDARY DISCHARGE DIAGNOSES  Diagnosis: Subtherapeutic international normalized ratio (INR)  Assessment and Plan of Treatment:     Diagnosis: Rapid atrial fibrillation  Assessment and Plan of Treatment:     Diagnosis: Elevated troponin  Assessment and Plan of Treatment:     Diagnosis: Unspecified atrial fibrillation  Assessment and Plan of Treatment:     Diagnosis: Dementia  Assessment and Plan of Treatment:     Diagnosis: ROBERTO CARLOS (acute kidney injury)  Assessment and Plan of Treatment:     Diagnosis: Benign essential HTN  Assessment and Plan of Treatment:     Diagnosis: Hyperlipidemia, unspecified  Assessment and Plan of Treatment:     Diagnosis: Cerebrovascular accident (CVA)  Assessment and Plan of Treatment:      yes

## 2024-05-21 NOTE — OB PROVIDER DELIVERY SUMMARY - NSSELHIDDEN_OBGYN_ALL_OB_FT
[NS_DeliveryAttending1_OBGYN_ALL_OB_FT:[email or web address]=],[NS_DeliveryAssist1_OBGYN_ALL_OB_FT:[id number]=],[NS_DeliveryRN_OBGYN_ALL_OB_FT:[id number]=]

## 2024-05-21 NOTE — OB POSTPARTUM EVENT NOTE - NS_EVENTFINDINGS1_OBGYN_ALL_OB_FT
POD #0 s/p LTCS with itching likely secondary to anesthesia with little response to benadryl or stadol.   Will order nubain and reassess   Discussed with Dr Lou

## 2024-05-21 NOTE — DISCHARGE NOTE OB - PATIENT PORTAL LINK FT
You can access the FollowMyHealth Patient Portal offered by [facility] by registering at the following website: [email or web address]. By joining [facility]’s FollowMyHealth portal, you will also be able to view your health information using other applications (apps) compatible with our system.

## 2024-05-21 NOTE — DISCHARGE NOTE OB - ADDITIONAL INSTRUCTIONS
Follow up @ [facility] office in 7days for PP BP Check  Monitor BP @ home 3 times daily (morning/noon/night)  keep a strict blood pressure log and bring to the office 5-7 days after hospital discharge for review  if you have BP > 160/100 call the office for further advise  if you have headaches, vision changes, upper abdominal pain call the office to notify and go to [facility] Triage for evaluation

## 2024-05-21 NOTE — DISCHARGE NOTE OB - CARE PROVIDER_API CALL
Cody Monahan  Obstetrics and Gynecology  [address] [place] [id number]  Phone: [phone]  Fax: [phone]  Follow Up Time: 1 week

## 2024-05-21 NOTE — LACTATION INITIAL EVALUATION - LACTATION INTERVENTIONS
Made aware of cluster feeding that occurs after 24 hours of life and to be cautious of sleep deprivation in order to maintain infant and mother safety. Instructed to place infant in bassinet or call for assistance if feeling sleepy or tired./initiate/review safe skin-to-skin/initiate/review hand expression/initiate/review techniques for position and latch/review techniques to manage sore nipples/engorgement/reviewed components of an effective feeding and at least 8 effective feedings per day required/reviewed importance of monitoring infant diapers, the breastfeeding log, and minimum output each day/reviewed strategies to transition to breastfeeding only/reviewed feeding on demand/by cue at least 8 times a day

## 2024-05-21 NOTE — OB POSTPARTUM EVENT NOTE - NS_EVENTPTSUMMARY1_OBGYN_ALL_OB_FT
ICU Vital Signs Last 24 Hrs  T(C): 36.5 (21 May 2024 18:28), Max: 37.0 (20 May 2024 23:00)  T(F): 97.7 (21 May 2024 18:28), Max: 98.6 (20 May 2024 23:00)  HR: 81 (21 May 2024 18:28) (70 - 127)  BP: 111/67 (21 May 2024 18:28) (105/60 - 161/93)  BP(mean): 84 (21 May 2024 04:30) (71 - 91)  ABP: --  ABP(mean): --  RR: 18 (21 May 2024 18:28) (18 - 28)  SpO2: 99% (21 May 2024 18:28) (91% - 100%)    O2 Parameters below as of 21 May 2024 18:28  Patient On (Oxygen Delivery Method): room air        Pt appears well, sitting in bed with baby, able to amubulate freely   Skin appears with no rashes   Uterus firm and non-tender   Minimal lochia

## 2024-05-21 NOTE — DISCHARGE NOTE OB - NS MD DC FALL RISK RISK
For information on Fall & Injury Prevention, visit: [email or web address] OR  [email or web address] OR  https://www.cdc.gov/steadi/patient.html

## 2024-05-21 NOTE — OB PROVIDER DELIVERY SUMMARY - NSPROVIDERDELIVERYNOTE_OBGYN_ALL_OB_FT
Single intrauterine pregnancy@39wk+2d for cat 2 tracing/prolonged decels  Liveborn female infant delivered from vertex presentation apgars 2/8  Large anterior fundal fibroid  Grossly normal adnexa    QBL: 372  IVF: 2000  UOP:300    Surgicel powder placed over hysterotomy site.

## 2024-05-22 LAB
ALBUMIN SERPL ELPH-MCNC: 2.6 G/DL — LOW (ref 3.3–5)
ALP SERPL-CCNC: 84 U/L — SIGNIFICANT CHANGE UP (ref 40–120)
ALT FLD-CCNC: 20 U/L — SIGNIFICANT CHANGE UP (ref 4–33)
ANION GAP SERPL CALC-SCNC: 11 MMOL/L — SIGNIFICANT CHANGE UP (ref 7–14)
AST SERPL-CCNC: 26 U/L — SIGNIFICANT CHANGE UP (ref 4–32)
BASOPHILS # BLD AUTO: 0.05 K/UL — SIGNIFICANT CHANGE UP (ref 0–0.2)
BASOPHILS NFR BLD AUTO: 0.3 % — SIGNIFICANT CHANGE UP (ref 0–2)
BILIRUB SERPL-MCNC: 0.4 MG/DL — SIGNIFICANT CHANGE UP (ref 0.2–1.2)
BUN SERPL-MCNC: 9 MG/DL — SIGNIFICANT CHANGE UP (ref 7–23)
CALCIUM SERPL-MCNC: 9.1 MG/DL — SIGNIFICANT CHANGE UP (ref 8.4–10.5)
CHLORIDE SERPL-SCNC: 106 MMOL/L — SIGNIFICANT CHANGE UP (ref 98–107)
CO2 SERPL-SCNC: 21 MMOL/L — LOW (ref 22–31)
CREAT SERPL-MCNC: 0.71 MG/DL — SIGNIFICANT CHANGE UP (ref 0.5–1.3)
EGFR: 117 ML/MIN/1.73M2 — SIGNIFICANT CHANGE UP
EOSINOPHIL # BLD AUTO: 0.09 K/UL — SIGNIFICANT CHANGE UP (ref 0–0.5)
EOSINOPHIL NFR BLD AUTO: 0.5 % — SIGNIFICANT CHANGE UP (ref 0–6)
GLUCOSE SERPL-MCNC: 81 MG/DL — SIGNIFICANT CHANGE UP (ref 70–99)
HCT VFR BLD CALC: 35.8 % — SIGNIFICANT CHANGE UP (ref 34.5–45)
HGB BLD-MCNC: 12.1 G/DL — SIGNIFICANT CHANGE UP (ref 11.5–15.5)
IANC: 15.85 K/UL — HIGH (ref 1.8–7.4)
IMM GRANULOCYTES NFR BLD AUTO: 0.6 % — SIGNIFICANT CHANGE UP (ref 0–0.9)
LDH SERPL L TO P-CCNC: 215 U/L — SIGNIFICANT CHANGE UP (ref 135–225)
LYMPHOCYTES # BLD AUTO: 1.34 K/UL — SIGNIFICANT CHANGE UP (ref 1–3.3)
LYMPHOCYTES # BLD AUTO: 7.4 % — LOW (ref 13–44)
MCHC RBC-ENTMCNC: 29.7 PG — SIGNIFICANT CHANGE UP (ref 27–34)
MCHC RBC-ENTMCNC: 33.8 GM/DL — SIGNIFICANT CHANGE UP (ref 32–36)
MCV RBC AUTO: 88 FL — SIGNIFICANT CHANGE UP (ref 80–100)
MONOCYTES # BLD AUTO: 0.73 K/UL — SIGNIFICANT CHANGE UP (ref 0–0.9)
MONOCYTES NFR BLD AUTO: 4 % — SIGNIFICANT CHANGE UP (ref 2–14)
NEUTROPHILS # BLD AUTO: 15.85 K/UL — HIGH (ref 1.8–7.4)
NEUTROPHILS NFR BLD AUTO: 87.2 % — HIGH (ref 43–77)
NRBC # BLD: 0 /100 WBCS — SIGNIFICANT CHANGE UP (ref 0–0)
NRBC # FLD: 0 K/UL — SIGNIFICANT CHANGE UP (ref 0–0)
PLATELET # BLD AUTO: 187 K/UL — SIGNIFICANT CHANGE UP (ref 150–400)
POTASSIUM SERPL-MCNC: 3.9 MMOL/L — SIGNIFICANT CHANGE UP (ref 3.5–5.3)
POTASSIUM SERPL-SCNC: 3.9 MMOL/L — SIGNIFICANT CHANGE UP (ref 3.5–5.3)
PROT SERPL-MCNC: 5.2 G/DL — LOW (ref 6–8.3)
RBC # BLD: 4.07 M/UL — SIGNIFICANT CHANGE UP (ref 3.8–5.2)
RBC # FLD: 14.4 % — SIGNIFICANT CHANGE UP (ref 10.3–14.5)
SODIUM SERPL-SCNC: 138 MMOL/L — SIGNIFICANT CHANGE UP (ref 135–145)
URATE SERPL-MCNC: 4.7 MG/DL — SIGNIFICANT CHANGE UP (ref 2.5–7)
WBC # BLD: 18.16 K/UL — HIGH (ref 3.8–10.5)
WBC # FLD AUTO: 18.16 K/UL — HIGH (ref 3.8–10.5)

## 2024-05-22 RX ORDER — IBUPROFEN 200 MG
600 TABLET ORAL EVERY 6 HOURS
Refills: 0 | Status: DISCONTINUED | OUTPATIENT
Start: 2024-05-22 | End: 2024-05-24

## 2024-05-22 RX ORDER — PIPERACILLIN AND TAZOBACTAM 4; .5 G/20ML; G/20ML
4.5 INJECTION, POWDER, LYOPHILIZED, FOR SOLUTION INTRAVENOUS EVERY 8 HOURS
Refills: 0 | Status: COMPLETED | OUTPATIENT
Start: 2024-05-22 | End: 2024-05-23

## 2024-05-22 RX ORDER — PIPERACILLIN AND TAZOBACTAM 4; .5 G/20ML; G/20ML
3.38 INJECTION, POWDER, LYOPHILIZED, FOR SOLUTION INTRAVENOUS EVERY 8 HOURS
Refills: 0 | Status: DISCONTINUED | OUTPATIENT
Start: 2024-05-22 | End: 2024-05-22

## 2024-05-22 RX ADMIN — Medication 975 MILLIGRAM(S): at 00:28

## 2024-05-22 RX ADMIN — Medication 30 MILLIGRAM(S): at 05:38

## 2024-05-22 RX ADMIN — Medication 600 MILLIGRAM(S): at 17:38

## 2024-05-22 RX ADMIN — Medication 975 MILLIGRAM(S): at 16:24

## 2024-05-22 RX ADMIN — Medication 600 MILLIGRAM(S): at 23:17

## 2024-05-22 RX ADMIN — SIMETHICONE 80 MILLIGRAM(S): 80 TABLET, CHEWABLE ORAL at 17:39

## 2024-05-22 RX ADMIN — Medication 975 MILLIGRAM(S): at 21:15

## 2024-05-22 RX ADMIN — Medication 325 MILLIGRAM(S): at 12:00

## 2024-05-22 RX ADMIN — NALBUPHINE HYDROCHLORIDE 2 MILLIGRAM(S): 10 INJECTION, SOLUTION INTRAMUSCULAR; INTRAVENOUS; SUBCUTANEOUS at 01:36

## 2024-05-22 RX ADMIN — Medication 975 MILLIGRAM(S): at 15:54

## 2024-05-22 RX ADMIN — Medication 600 MILLIGRAM(S): at 18:08

## 2024-05-22 RX ADMIN — SENNA PLUS 2 TABLET(S): 8.6 TABLET ORAL at 23:39

## 2024-05-22 RX ADMIN — HEPARIN SODIUM 5000 UNIT(S): 5000 INJECTION INTRAVENOUS; SUBCUTANEOUS at 05:38

## 2024-05-22 RX ADMIN — SIMETHICONE 80 MILLIGRAM(S): 80 TABLET, CHEWABLE ORAL at 23:17

## 2024-05-22 RX ADMIN — NALBUPHINE HYDROCHLORIDE 2 MILLIGRAM(S): 10 INJECTION, SOLUTION INTRAMUSCULAR; INTRAVENOUS; SUBCUTANEOUS at 02:06

## 2024-05-22 RX ADMIN — PIPERACILLIN AND TAZOBACTAM 200 GRAM(S): 4; .5 INJECTION, POWDER, LYOPHILIZED, FOR SOLUTION INTRAVENOUS at 12:00

## 2024-05-22 RX ADMIN — Medication 600 MILLIGRAM(S): at 12:00

## 2024-05-22 RX ADMIN — Medication 30 MILLIGRAM(S): at 06:08

## 2024-05-22 RX ADMIN — Medication 1 TABLET(S): at 12:00

## 2024-05-22 RX ADMIN — HEPARIN SODIUM 5000 UNIT(S): 5000 INJECTION INTRAVENOUS; SUBCUTANEOUS at 17:39

## 2024-05-22 RX ADMIN — Medication 975 MILLIGRAM(S): at 20:32

## 2024-05-22 RX ADMIN — Medication 600 MILLIGRAM(S): at 12:30

## 2024-05-22 NOTE — PROGRESS NOTE ADULT - ASSESSMENT
Patient seen at bedside resting comfortably offers no new complaints. not yet ambulating, joseph just removed no void spontaneously yet.  + flatus;  no bm; tolerating clr liq diet. both breast and bottle feeding. Denies HA, blurry vision or epigastric pain, CP, SOB, N/V/D,  dizziness, palpitations, worsening vaginal bleeding.    Vital Signs Last 24 Hrs  T(C): 36.7 (22 May 2024 07:06), Max: 37.1 (22 May 2024 01:36)  T(F): 98 (22 May 2024 07:06), Max: 98.7 (22 May 2024 01:36)  HR: 88 (22 May 2024 07:06) (79 - 93)  BP: 114/72 (22 May 2024 07:06) (111/67 - 128/77)  BP(mean): --  RR: 17 (22 May 2024 07:06) (17 - 19)  SpO2: 98% (22 May 2024 07:06) (98% - 100%)    Parameters below as of 22 May 2024 07:06  Patient On (Oxygen Delivery Method): room air        Gen: A&O x 3, NAD  Chest: CTA B/L  Cardiac: S1,S2  RRR  Breast: Soft, nontender, nonengorged  Abdomen: +BS; soft; Nontender, nondistended; dressing removed incision C/D/I steri strips in place  Gyn: minimal lochia   Extremities: Nontender, venodynes in place                          12.1   18.16 )-----------( 187      ( 22 May 2024 06:45 )             35.8       A/P: 33 yr old F POD #1 s/p p/c/s for cat II on 5/21/2024 c/b myomas, GDMA1, and GHTN     #GHTN   - Overnight BPs 100-130s/70s  - HELLP Labs- WNL   - P/C Ratio 0.2  - PEC reources provided   - BP Cuff sent to home pharmacy via [facility]     #PP    - Pain management as needed  - OOB and ambulate  - Incision C/D/I   - Incision care discussed   - Encourage breastfeeding     -d/w Hero Haywood NP  Patient seen at bedside resting comfortably offers no new complaints. not yet ambulating, joseph just removed no void spontaneously yet.  + flatus;  no bm; tolerating clr liq diet. both breast and bottle feeding. Denies HA, blurry vision or epigastric pain, CP, SOB, N/V/D,  dizziness, palpitations, worsening vaginal bleeding.    Vital Signs Last 24 Hrs  T(C): 36.7 (22 May 2024 07:06), Max: 37.1 (22 May 2024 01:36)  T(F): 98 (22 May 2024 07:06), Max: 98.7 (22 May 2024 01:36)  HR: 88 (22 May 2024 07:06) (79 - 93)  BP: 114/72 (22 May 2024 07:06) (111/67 - 128/77)  BP(mean): --  RR: 17 (22 May 2024 07:06) (17 - 19)  SpO2: 98% (22 May 2024 07:06) (98% - 100%)    Parameters below as of 22 May 2024 07:06  Patient On (Oxygen Delivery Method): room air        Gen: A&O x 3, NAD  Chest: CTA B/L  Cardiac: S1,S2  RRR  Breast: Soft, nontender, nonengorged  Abdomen: +BS; soft; Nontender, nondistended; dressing removed incision C/D/I steri strips in place  Gyn: minimal lochia   Extremities: Nontender, venodynes in place                          12.1   18.16 )-----------( 187      ( 22 May 2024 06:45 )             35.8       A/P: 33 yr old F POD #1 s/p p/c/s for cat II on 5/21/2024 c/b myomas, GDMA1, and GHTN     #GHTN   - Overnight BPs 100-130s/70s  - HELLP Labs- WNL   - P/C Ratio 0.2  - PEC reources provided   - BP Cuff sent to home pharmacy via [facility]     #PP    - Pain management as needed  - OOB and ambulate  - Incision C/D/I   - Incision care discussed   - CBC in AM to trend WBC (18)  - Encourage breastfeeding     -d/w Hero Haywood NP  Patient seen at bedside resting comfortably offers no new complaints. not yet ambulating, joseph just removed no void spontaneously yet.  + flatus;  no bm; tolerating clr liq diet. both breast and bottle feeding. Denies HA, blurry vision or epigastric pain, CP, SOB, N/V/D,  dizziness, palpitations, worsening vaginal bleeding.    Vital Signs Last 24 Hrs  T(C): 36.7 (22 May 2024 07:06), Max: 37.1 (22 May 2024 01:36)  T(F): 98 (22 May 2024 07:06), Max: 98.7 (22 May 2024 01:36)  HR: 88 (22 May 2024 07:06) (79 - 93)  BP: 114/72 (22 May 2024 07:06) (111/67 - 128/77)  BP(mean): --  RR: 17 (22 May 2024 07:06) (17 - 19)  SpO2: 98% (22 May 2024 07:06) (98% - 100%)    Parameters below as of 22 May 2024 07:06  Patient On (Oxygen Delivery Method): room air        Gen: A&O x 3, NAD  Chest: CTA B/L  Cardiac: S1,S2  RRR  Breast: Soft, nontender, nonengorged  Abdomen: +BS; soft; Nontender, nondistended; dressing removed incision C/D/I steri strips in place  Gyn: minimal lochia   Extremities: Nontender, venodynes in place                          12.1   18.16 )-----------( 187      ( 22 May 2024 06:45 )             35.8       A/P: 33 yr old F POD #1 s/p p/c/s for cat II on 5/21/2024 c/b myomas, GDMA1, GHTN, and Endometritis      #GHTN   - Overnight BPs 100-130s/70s  - HELLP Labs- WNL   - P/C Ratio 0.2  - PEC reources provided   - Pt has BP Cuff     #Endometritis   - +Uterine Tenderness 7-8/10  - WBC 18  - Zosyn x 24hrs     #PP    - Pain management as needed  - OOB and ambulate  - Incision C/D/I   - Incision care discussed   - CBC in AM to trend WBC (18)  - Encourage breastfeeding     -d/w [place]     T. Haywood NP

## 2024-05-22 NOTE — PROVIDER CONTACT NOTE (OTHER) - ASSESSMENT
IV on right wrist infiltrated and removed. New IV access attempted on both arms and by multiple nurses, including L&D but not successful.

## 2024-05-23 LAB
BASOPHILS # BLD AUTO: 0.03 K/UL — SIGNIFICANT CHANGE UP (ref 0–0.2)
BASOPHILS NFR BLD AUTO: 0.3 % — SIGNIFICANT CHANGE UP (ref 0–2)
EOSINOPHIL # BLD AUTO: 0.18 K/UL — SIGNIFICANT CHANGE UP (ref 0–0.5)
EOSINOPHIL NFR BLD AUTO: 1.5 % — SIGNIFICANT CHANGE UP (ref 0–6)
HCT VFR BLD CALC: 35.6 % — SIGNIFICANT CHANGE UP (ref 34.5–45)
HGB BLD-MCNC: 12.2 G/DL — SIGNIFICANT CHANGE UP (ref 11.5–15.5)
IANC: 9.39 K/UL — HIGH (ref 1.8–7.4)
IMM GRANULOCYTES NFR BLD AUTO: 0.8 % — SIGNIFICANT CHANGE UP (ref 0–0.9)
LYMPHOCYTES # BLD AUTO: 1.54 K/UL — SIGNIFICANT CHANGE UP (ref 1–3.3)
LYMPHOCYTES # BLD AUTO: 13.1 % — SIGNIFICANT CHANGE UP (ref 13–44)
MCHC RBC-ENTMCNC: 30 PG — SIGNIFICANT CHANGE UP (ref 27–34)
MCHC RBC-ENTMCNC: 34.3 GM/DL — SIGNIFICANT CHANGE UP (ref 32–36)
MCV RBC AUTO: 87.7 FL — SIGNIFICANT CHANGE UP (ref 80–100)
MONOCYTES # BLD AUTO: 0.55 K/UL — SIGNIFICANT CHANGE UP (ref 0–0.9)
MONOCYTES NFR BLD AUTO: 4.7 % — SIGNIFICANT CHANGE UP (ref 2–14)
NEUTROPHILS # BLD AUTO: 9.39 K/UL — HIGH (ref 1.8–7.4)
NEUTROPHILS NFR BLD AUTO: 79.6 % — HIGH (ref 43–77)
NRBC # BLD: 0 /100 WBCS — SIGNIFICANT CHANGE UP (ref 0–0)
NRBC # FLD: 0 K/UL — SIGNIFICANT CHANGE UP (ref 0–0)
PLATELET # BLD AUTO: 224 K/UL — SIGNIFICANT CHANGE UP (ref 150–400)
RBC # BLD: 4.06 M/UL — SIGNIFICANT CHANGE UP (ref 3.8–5.2)
RBC # FLD: 14 % — SIGNIFICANT CHANGE UP (ref 10.3–14.5)
WBC # BLD: 11.78 K/UL — HIGH (ref 3.8–10.5)
WBC # FLD AUTO: 11.78 K/UL — HIGH (ref 3.8–10.5)

## 2024-05-23 RX ORDER — ONDANSETRON 8 MG/1
4 TABLET, FILM COATED ORAL EVERY 6 HOURS
Refills: 0 | Status: DISCONTINUED | OUTPATIENT
Start: 2024-05-23 | End: 2024-05-24

## 2024-05-23 RX ORDER — OXYCODONE HYDROCHLORIDE 5 MG/1
5 TABLET ORAL
Refills: 0 | Status: DISCONTINUED | OUTPATIENT
Start: 2024-05-23 | End: 2024-05-24

## 2024-05-23 RX ORDER — ACETAMINOPHEN 500 MG
3 TABLET ORAL
Qty: 0 | Refills: 0 | DISCHARGE
Start: 2024-05-23

## 2024-05-23 RX ORDER — FERROUS SULFATE 325(65) MG
1 TABLET ORAL
Qty: 0 | Refills: 0 | DISCHARGE
Start: 2024-05-23

## 2024-05-23 RX ORDER — IBUPROFEN 200 MG
1 TABLET ORAL
Qty: 0 | Refills: 0 | DISCHARGE
Start: 2024-05-23

## 2024-05-23 RX ORDER — ONDANSETRON 8 MG/1
4 TABLET, FILM COATED ORAL ONCE
Refills: 0 | Status: COMPLETED | OUTPATIENT
Start: 2024-05-23 | End: 2024-05-23

## 2024-05-23 RX ADMIN — PIPERACILLIN AND TAZOBACTAM 200 GRAM(S): 4; .5 INJECTION, POWDER, LYOPHILIZED, FOR SOLUTION INTRAVENOUS at 08:40

## 2024-05-23 RX ADMIN — Medication 600 MILLIGRAM(S): at 00:00

## 2024-05-23 RX ADMIN — Medication 325 MILLIGRAM(S): at 12:20

## 2024-05-23 RX ADMIN — Medication 975 MILLIGRAM(S): at 09:40

## 2024-05-23 RX ADMIN — Medication 600 MILLIGRAM(S): at 17:22

## 2024-05-23 RX ADMIN — Medication 600 MILLIGRAM(S): at 06:25

## 2024-05-23 RX ADMIN — SIMETHICONE 80 MILLIGRAM(S): 80 TABLET, CHEWABLE ORAL at 18:18

## 2024-05-23 RX ADMIN — Medication 975 MILLIGRAM(S): at 02:39

## 2024-05-23 RX ADMIN — Medication 975 MILLIGRAM(S): at 20:14

## 2024-05-23 RX ADMIN — Medication 975 MILLIGRAM(S): at 03:10

## 2024-05-23 RX ADMIN — HEPARIN SODIUM 5000 UNIT(S): 5000 INJECTION INTRAVENOUS; SUBCUTANEOUS at 05:37

## 2024-05-23 RX ADMIN — Medication 600 MILLIGRAM(S): at 12:20

## 2024-05-23 RX ADMIN — Medication 975 MILLIGRAM(S): at 08:40

## 2024-05-23 RX ADMIN — Medication 1 TABLET(S): at 12:20

## 2024-05-23 RX ADMIN — MAGNESIUM HYDROXIDE 30 MILLILITER(S): 400 TABLET, CHEWABLE ORAL at 05:37

## 2024-05-23 RX ADMIN — OXYCODONE HYDROCHLORIDE 5 MILLIGRAM(S): 5 TABLET ORAL at 09:40

## 2024-05-23 RX ADMIN — HEPARIN SODIUM 5000 UNIT(S): 5000 INJECTION INTRAVENOUS; SUBCUTANEOUS at 17:17

## 2024-05-23 RX ADMIN — Medication 600 MILLIGRAM(S): at 05:37

## 2024-05-23 RX ADMIN — Medication 600 MILLIGRAM(S): at 13:20

## 2024-05-23 RX ADMIN — PIPERACILLIN AND TAZOBACTAM 200 GRAM(S): 4; .5 INJECTION, POWDER, LYOPHILIZED, FOR SOLUTION INTRAVENOUS at 00:18

## 2024-05-23 RX ADMIN — OXYCODONE HYDROCHLORIDE 5 MILLIGRAM(S): 5 TABLET ORAL at 20:14

## 2024-05-23 RX ADMIN — Medication 975 MILLIGRAM(S): at 21:00

## 2024-05-23 RX ADMIN — Medication 600 MILLIGRAM(S): at 18:22

## 2024-05-23 RX ADMIN — Medication 975 MILLIGRAM(S): at 15:53

## 2024-05-23 RX ADMIN — OXYCODONE HYDROCHLORIDE 5 MILLIGRAM(S): 5 TABLET ORAL at 21:00

## 2024-05-23 RX ADMIN — OXYCODONE HYDROCHLORIDE 5 MILLIGRAM(S): 5 TABLET ORAL at 08:40

## 2024-05-23 RX ADMIN — ONDANSETRON 4 MILLIGRAM(S): 8 TABLET, FILM COATED ORAL at 06:37

## 2024-05-23 RX ADMIN — Medication 975 MILLIGRAM(S): at 14:53

## 2024-05-23 NOTE — PROGRESS NOTE ADULT - ASSESSMENT
A/P: 33 yr old F POD #2 s/p p/c/s for cat II on 5/21/2024 c/b myomas, GDMA1, GHTN, and Endometritis      #GHTN   - BP x 24 hours: BP:  (120/80 - 135/82)  - HELLP Labs- WNL   - P/C Ratio 0.2  -Declined sxs of PEC   - PEC reources provided   - Pt has BP Cuff     #Endometritis   CBC reviewed  18.16--->11.78  afebrile  s/p zosyn x24 hours    Her pain is well controlled.   She is tolerating a regular diet and passing flatus.   Denies N/V. Denies CP/SOB/lightheadedness/dizziness.   She is ambulating without difficulty.   Voiding spontaneously.    Patient is stable and doing well post-operatively.    - Continue regular diet.  - Increase ambulation.  - Continue motrin, tylenol, oxycodone PRN for pain control.   -Encourage breastfeeding.  -Incisional care and PO instructions reviewed.     Follow up @ [facility] office in 7days for PP BP Check  Monitor BP @ home 3 times daily (morning/noon/night)  keep a strict blood pressure log and bring to the office 7 days after hospital discharge for review  if you have BP > 160/100 call the office for further advise  if you have headaches, vision changes, upper abdominal pain call the office to notify and go to [facility] Triage for evaluation    Follow up @ [facility] in 1 weeks for incision check visit  [phone]    Discussed with MD Hero LINARES

## 2024-05-24 VITALS — HEART RATE: 59 BPM | DIASTOLIC BLOOD PRESSURE: 82 MMHG | SYSTOLIC BLOOD PRESSURE: 144 MMHG

## 2024-05-24 DIAGNOSIS — O14.90 UNSPECIFIED PRE-ECLAMPSIA, UNSPECIFIED TRIMESTER: ICD-10-CM

## 2024-05-24 DIAGNOSIS — O13.9 GESTATIONAL [PREGNANCY-INDUCED] HYPERTENSION WITHOUT SIGNIFICANT PROTEINURIA, UNSPECIFIED TRIMESTER: ICD-10-CM

## 2024-05-24 RX ADMIN — Medication 975 MILLIGRAM(S): at 03:17

## 2024-05-24 RX ADMIN — Medication 600 MILLIGRAM(S): at 12:36

## 2024-05-24 RX ADMIN — Medication 600 MILLIGRAM(S): at 01:14

## 2024-05-24 RX ADMIN — Medication 600 MILLIGRAM(S): at 11:36

## 2024-05-24 RX ADMIN — Medication 975 MILLIGRAM(S): at 04:00

## 2024-05-24 RX ADMIN — Medication 975 MILLIGRAM(S): at 09:31

## 2024-05-24 RX ADMIN — HEPARIN SODIUM 5000 UNIT(S): 5000 INJECTION INTRAVENOUS; SUBCUTANEOUS at 06:32

## 2024-05-24 RX ADMIN — Medication 600 MILLIGRAM(S): at 00:30

## 2024-05-24 RX ADMIN — Medication 975 MILLIGRAM(S): at 15:07

## 2024-05-24 RX ADMIN — Medication 975 MILLIGRAM(S): at 08:31

## 2024-05-24 RX ADMIN — Medication 975 MILLIGRAM(S): at 14:57

## 2024-05-24 RX ADMIN — SIMETHICONE 80 MILLIGRAM(S): 80 TABLET, CHEWABLE ORAL at 08:31

## 2024-05-24 RX ADMIN — Medication 600 MILLIGRAM(S): at 07:01

## 2024-05-24 RX ADMIN — SIMETHICONE 80 MILLIGRAM(S): 80 TABLET, CHEWABLE ORAL at 03:20

## 2024-05-24 RX ADMIN — Medication 1 TABLET(S): at 11:31

## 2024-05-24 RX ADMIN — Medication 600 MILLIGRAM(S): at 06:31

## 2024-05-24 RX ADMIN — SENNA PLUS 2 TABLET(S): 8.6 TABLET ORAL at 00:30

## 2024-05-24 NOTE — PROVIDER CONTACT NOTE (OTHER) - ACTION/TREATMENT ORDERED:
Urine output reviewed with MD Breanne mp new orders at this time
MD Pat aware.  MD to put Zofran order in.
MD Melita resendiz.  MD to speak w/ Dr. Gray for further plan of care.
Dr. Refugio Bryson made aware.
Dr. Refugio Bryson made aware. Zofran to be given.

## 2024-05-24 NOTE — PROVIDER CONTACT NOTE (OTHER) - SITUATION
Patient c/o nausea/vomiting/stomach pain
Patient  w/ endometritis due for 2 more doses of Zosyn without IV access.
Pt has urine output of 40cc/ hour, adequate is 50cc/ per hour
Pt. w/ complaints of nausea.
Pt. /82

## 2024-05-24 NOTE — PROGRESS NOTE ADULT - ASSESSMENT
Assessment and Plan  POD #3 s/p C/S.  Doing well, bonding with baby, support at bedside  GHTN  ·	continue to monitor BPs at home TID, keep logs and bring to each visit  ·	pt has bp cuff at home, logs given  ·	pt to call for bp's > 140/90  ·	PEC precautions reviewed  ·	HELLp lavs wnl, urine WA/CR 0.2  endometritis  ·	s/p zosyn 24hrs  ·	WBC's downtrending  ·	afebrile  ·	VSS  Encourage ambulation.  Incisional care and PO instructions reviewed.  CPC.  discharge home today  RTO within 1 week for bp check and aquacel dressing removal and incision check/PRN

## 2024-05-24 NOTE — PROGRESS NOTE ADULT - SUBJECTIVE AND OBJECTIVE BOX
Post-Operative Note, C/S  She is a  33y woman who is now post-operative day: 3    Subjective:  The patient feels well.  She is ambulating.   She is tolerating regular diet.  She denies nausea and vomiting; denies fever.  She is voiding.  Her pain is controlled; incisional pain is appropriate.  She reports normal postpartum bleeding.  She is breastfeeding.  She denies HA, blurry vision, epigastric pain, SOB, dizziness or lightheadedness    Physical exam:    Vital Signs Last 24 Hrs  T(C): 36.5 (24 May 2024 09:47), Max: 36.7 (23 May 2024 17:53)  T(F): 97.7 (24 May 2024 09:47), Max: 98 (23 May 2024 17:53)  HR: 66 (24 May 2024 09:47) (64 - 97)  BP: 134/83 (24 May 2024 09:47) (129/80 - 138/85)  BP(mean): --  RR: 18 (24 May 2024 09:47) (17 - 18)  SpO2: 100% (24 May 2024 09:47) (96% - 100%)    Parameters below as of 24 May 2024 09:47  Patient On (Oxygen Delivery Method): room air        Gen: NAD  Breast: Soft, nontender, not engorged.  Abdomen: Soft, nontender, no distension , firm uterine fundus at umbilicus.  Incision: C/D/I.  Pelvic: Normal lochia noted  Ext: No calf tenderness    LABS:                        12.2   11.78 )-----------( 224      ( 23 May 2024 06:00 )             35.6         Allergies    No Known Allergies        MEDICATIONS  (STANDING):  acetaminophen     Tablet .. 975 milliGRAM(s) Oral <User Schedule>  diphtheria/tetanus/pertussis (acellular) Vaccine (Adacel) 0.5 milliLiter(s) IntraMuscular once  ferrous    sulfate 325 milliGRAM(s) Oral daily  heparin   Injectable 5000 Unit(s) SubCutaneous every 12 hours  ibuprofen  Tablet. 600 milliGRAM(s) Oral every 6 hours  prenatal multivitamin 1 Tablet(s) Oral daily  senna 2 Tablet(s) Oral at bedtime    MEDICATIONS  (PRN):  diphenhydrAMINE 25 milliGRAM(s) Oral every 6 hours PRN Pruritus  lanolin Ointment 1 Application(s) Topical every 6 hours PRN Sore Nipples  magnesium hydroxide Suspension 30 milliLiter(s) Oral two times a day PRN Constipation  ondansetron    Tablet 4 milliGRAM(s) Oral every 6 hours PRN Nausea and/or Vomiting  oxyCODONE    IR 5 milliGRAM(s) Oral every 3 hours PRN Moderate to Severe Pain (4-10)  oxyCODONE    IR 5 milliGRAM(s) Oral once PRN Moderate to Severe Pain (4-10)  simethicone 80 milliGRAM(s) Chew every 4 hours PRN Gas              
NP  Progress Note POD # 2    Pt seen, examined at bedside and doing well meeting all post operative milestones. Patient bonding well with infant. Breastfeeding...  Pt states mild abdominal pain. Pt denies fever, chills, chest pain, SOB, nausea, vomiting, lightheadedness, dizziness.  Pt states passing flatus,     T(F): 98.1 (05-23-24 @ 10:22), Max: 98.4 (05-22-24 @ 14:10)  HR: 77 (05-23-24 @ 10:22) (76 - 95)  BP: 126/74 (05-23-24 @ 10:22) (120/80 - 135/82)  RR: 18 (05-23-24 @ 10:22) (17 - 19)  SpO2: 98% (05-23-24 @ 10:22) (97% - 100%)  Wt(kg): --  CAPILLARY BLOOD GLUCOSE    I&O's Detail      MEDICATIONS  (STANDING):  acetaminophen     Tablet .. 975 milliGRAM(s) Oral <User Schedule>  diphtheria/tetanus/pertussis (acellular) Vaccine (Adacel) 0.5 milliLiter(s) IntraMuscular once  ferrous    sulfate 325 milliGRAM(s) Oral daily  heparin   Injectable 5000 Unit(s) SubCutaneous every 12 hours  ibuprofen  Tablet. 600 milliGRAM(s) Oral every 6 hours  prenatal multivitamin 1 Tablet(s) Oral daily  senna 2 Tablet(s) Oral at bedtime    MEDICATIONS  (PRN):  diphenhydrAMINE 25 milliGRAM(s) Oral every 6 hours PRN Pruritus  lanolin Ointment 1 Application(s) Topical every 6 hours PRN Sore Nipples  magnesium hydroxide Suspension 30 milliLiter(s) Oral two times a day PRN Constipation  oxyCODONE    IR 5 milliGRAM(s) Oral every 3 hours PRN Moderate to Severe Pain (4-10)  oxyCODONE    IR 5 milliGRAM(s) Oral once PRN Moderate to Severe Pain (4-10)  simethicone 80 milliGRAM(s) Chew every 4 hours PRN Gas      Physical Exam:  Constitutional: WDWN female, NAD AxOx3  Skin: no breakdowns noted, warm and dry  Chest: s1s2+, RRR, clear to auscultation bilaterally, no w/r/r    Breasts: soft, nonengorged, no reddness, no warmth bilaterally  Abdomen: Fundus firm appropriate tenderness noted   Incision site:  aquacel Dressing clean, and dry.  GYN: Lochia light  Extremities: no lower extremity edema or calf tenderness bilaterally; intermittent compression stockings in place     LABS:             12.2   11.78 )-----------( 224      ( 05-23 @ 06:00 )             35.6                12.1   18.16 )-----------( 187      ( 05-22 @ 06:45 )             35.8                17.0   9.79  )-----------( 225      ( 05-20 @ 21:30 )             50.5       05-22    138    |  106    |  9      ----------------------------<  81     3.9     |  21<L>  |  0.71     Ca    9.1        22 May 2024 06:45    TPro  5.2<L>  /  Alb  2.6<L>  /  TBili  0.4    /  DBili  x      /  AST  26     /  ALT  20     /  AlkPhos  84     05-22          Urinalysis Basic - ( 22 May 2024 06:45 )    Color: x / Appearance: x / SG: x / pH: x  Gluc: 81 mg/dL / Ketone: x  / Bili: x / Urobili: x   Blood: x / Protein: x / Nitrite: x   Leuk Esterase: x / RBC: x / WBC x   Sq Epi: x / Non Sq Epi: x / Bacteria: x

## 2024-05-24 NOTE — PROVIDER CONTACT NOTE (OTHER) - REASON
Patient c/o nausea/vomiting
Pt. /82
Low urine outpit
Delay of antibiotic
Pt. w/ complaints of nausea

## 2024-05-24 NOTE — PROVIDER CONTACT NOTE (OTHER) - ASSESSMENT
Pt. /82, HR 58, 97.8F temp, 100O2, 17RR.  Pt. denies any lightheadedness/dizziness, blurred vision, or headache. Pt. VS @ 1252: /82, HR 58, 97.8F temp, 100O2, 17RR.  Pt. denies any lightheadedness/dizziness, blurred vision, or headache.

## 2024-05-24 NOTE — PROVIDER CONTACT NOTE (OTHER) - BACKGROUND
P C/S 5/21 @ 0044. Elevated WBC and abdominal tenderness diagnosed w/ endometritis, placed on Zosyn q8.
P C/S 5/21 @ 0044. Endometritis on Zosyn q8.
Primary c/s r/t cat II 5/21/2024 @ 0044.  Parity 1001.  QBL 372mL.  39.1 weeks gestation.  O+ bloodtype.  GHTN, PCR 0.2.  GDMA1.  Endometritis, s/p Zosyn.
s/p rpt csection for gHTN 
Primary c/s r/t cat II 5/21/2024 @ 0044.  Parity 1001.  QBL 372mL.  39.1 weeks gestation.  O+ bloodtype.  GHTN, PCR 0.2.  GDMA1.  Endometritis, s/p Zosyn.

## 2024-06-11 ENCOUNTER — APPOINTMENT (OUTPATIENT)
Dept: INTERNAL MEDICINE | Facility: CLINIC | Age: 34
End: 2024-06-11
Payer: COMMERCIAL

## 2024-06-11 VITALS
HEART RATE: 81 BPM | OXYGEN SATURATION: 96 % | BODY MASS INDEX: 29.96 KG/M2 | WEIGHT: 167 LBS | RESPIRATION RATE: 16 BRPM | SYSTOLIC BLOOD PRESSURE: 107 MMHG | DIASTOLIC BLOOD PRESSURE: 71 MMHG | HEIGHT: 62.5 IN | TEMPERATURE: 98 F

## 2024-06-11 DIAGNOSIS — Z82.49 FAMILY HISTORY OF ISCHEMIC HEART DISEASE AND OTHER DISEASES OF THE CIRCULATORY SYSTEM: ICD-10-CM

## 2024-06-11 DIAGNOSIS — O13.9 GESTATIONAL [PREGNANCY-INDUCED] HYPERTENSION W/OUT SIGNIFICANT PROTEINURIA, UNSPECIFIED TRIMESTER: ICD-10-CM

## 2024-06-11 DIAGNOSIS — Z86.32 PERSONAL HISTORY OF GESTATIONAL DIABETES: ICD-10-CM

## 2024-06-11 DIAGNOSIS — Z83.3 FAMILY HISTORY OF DIABETES MELLITUS: ICD-10-CM

## 2024-06-11 DIAGNOSIS — Z13.31 ENCOUNTER FOR SCREENING FOR DEPRESSION: ICD-10-CM

## 2024-06-11 PROCEDURE — 36415 COLL VENOUS BLD VENIPUNCTURE: CPT

## 2024-06-11 PROCEDURE — G0444 DEPRESSION SCREEN ANNUAL: CPT | Mod: 59

## 2024-06-11 PROCEDURE — 99204 OFFICE O/P NEW MOD 45 MIN: CPT | Mod: 25

## 2024-06-11 RX ORDER — NIFEDIPINE 30 MG/1
30 TABLET, FILM COATED, EXTENDED RELEASE ORAL
Refills: 0 | Status: ACTIVE | COMMUNITY

## 2024-06-11 NOTE — HISTORY OF PRESENT ILLNESS
[FreeTextEntry1] : Establish care [de-identified] : Ms. BLOSSOM BRYANT is a 33 year old female, 3 weeks postpartum here to establish care -Had gestational DM-diet controlled and developed gestational HTN towards end of pregnancy, she had a  as baby with elevated HR  -Currently on Procardia, has seen OB post op and advised to c/w current dose States recently with lower readings-denies dizziness -Currently breastfeeding

## 2024-06-11 NOTE — PLAN
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

## 2024-06-11 NOTE — PHYSICAL EXAM
[Soft] : abdomen soft [No HSM] : no HSM [Normal] : affect was normal and insight and judgment were intact

## 2024-06-17 LAB
ANION GAP SERPL CALC-SCNC: 13 MMOL/L
BASOPHILS # BLD AUTO: 0.06 K/UL
BASOPHILS NFR BLD AUTO: 1.2 %
BUN SERPL-MCNC: 18 MG/DL
CALCIUM SERPL-MCNC: 9.5 MG/DL
CHLORIDE SERPL-SCNC: 104 MMOL/L
CHOLEST SERPL-MCNC: 227 MG/DL
CO2 SERPL-SCNC: 23 MMOL/L
CREAT SERPL-MCNC: 1.08 MG/DL
EGFR: 70 ML/MIN/1.73M2
EOSINOPHIL # BLD AUTO: 0.11 K/UL
EOSINOPHIL NFR BLD AUTO: 2.3 %
ESTIMATED AVERAGE GLUCOSE: 114 MG/DL
GLUCOSE SERPL-MCNC: 76 MG/DL
HBA1C MFR BLD HPLC: 5.6 %
HCT VFR BLD CALC: 48.1 %
HDLC SERPL-MCNC: 62 MG/DL
HGB BLD-MCNC: 14.9 G/DL
IMM GRANULOCYTES NFR BLD AUTO: 0.2 %
LDLC SERPL CALC-MCNC: 158 MG/DL
LYMPHOCYTES # BLD AUTO: 1.63 K/UL
LYMPHOCYTES NFR BLD AUTO: 33.6 %
MAN DIFF?: NORMAL
MCHC RBC-ENTMCNC: 28.8 PG
MCHC RBC-ENTMCNC: 31 GM/DL
MCV RBC AUTO: 92.9 FL
MONOCYTES # BLD AUTO: 0.33 K/UL
MONOCYTES NFR BLD AUTO: 6.8 %
NEUTROPHILS # BLD AUTO: 2.71 K/UL
NEUTROPHILS NFR BLD AUTO: 55.9 %
NONHDLC SERPL-MCNC: 165 MG/DL
PLATELET # BLD AUTO: 425 K/UL
POTASSIUM SERPL-SCNC: 4.5 MMOL/L
RBC # BLD: 5.18 M/UL
RBC # FLD: 13.2 %
SODIUM SERPL-SCNC: 140 MMOL/L
TRIGL SERPL-MCNC: 42 MG/DL
WBC # FLD AUTO: 4.85 K/UL

## 2024-07-09 ENCOUNTER — APPOINTMENT (OUTPATIENT)
Dept: CARDIOLOGY | Facility: CLINIC | Age: 34
End: 2024-07-09
Payer: COMMERCIAL

## 2024-07-09 ENCOUNTER — NON-APPOINTMENT (OUTPATIENT)
Age: 34
End: 2024-07-09

## 2024-07-09 VITALS
HEIGHT: 62.5 IN | OXYGEN SATURATION: 99 % | BODY MASS INDEX: 29.48 KG/M2 | WEIGHT: 164.31 LBS | HEART RATE: 53 BPM | DIASTOLIC BLOOD PRESSURE: 84 MMHG | SYSTOLIC BLOOD PRESSURE: 130 MMHG | TEMPERATURE: 97.8 F

## 2024-07-09 DIAGNOSIS — R00.2 PALPITATIONS: ICD-10-CM

## 2024-07-09 DIAGNOSIS — R94.31 ABNORMAL ELECTROCARDIOGRAM [ECG] [EKG]: ICD-10-CM

## 2024-07-09 DIAGNOSIS — O13.9 GESTATIONAL [PREGNANCY-INDUCED] HYPERTENSION W/OUT SIGNIFICANT PROTEINURIA, UNSPECIFIED TRIMESTER: ICD-10-CM

## 2024-07-09 PROCEDURE — 99204 OFFICE O/P NEW MOD 45 MIN: CPT

## 2024-07-09 PROCEDURE — 93000 ELECTROCARDIOGRAM COMPLETE: CPT

## 2024-07-22 ENCOUNTER — NON-APPOINTMENT (OUTPATIENT)
Age: 34
End: 2024-07-22

## 2024-08-01 ENCOUNTER — MESSAGE (OUTPATIENT)
Age: 34
End: 2024-08-01

## 2024-08-01 ENCOUNTER — APPOINTMENT (OUTPATIENT)
Dept: CARDIOLOGY | Facility: CLINIC | Age: 34
End: 2024-08-01
Payer: COMMERCIAL

## 2024-08-01 PROCEDURE — 93306 TTE W/DOPPLER COMPLETE: CPT

## 2024-08-29 ENCOUNTER — APPOINTMENT (OUTPATIENT)
Dept: INTERNAL MEDICINE | Facility: CLINIC | Age: 34
End: 2024-08-29
Payer: COMMERCIAL

## 2024-08-29 VITALS
WEIGHT: 165 LBS | DIASTOLIC BLOOD PRESSURE: 77 MMHG | HEIGHT: 62 IN | TEMPERATURE: 97.5 F | OXYGEN SATURATION: 98 % | SYSTOLIC BLOOD PRESSURE: 127 MMHG | HEART RATE: 75 BPM | BODY MASS INDEX: 30.36 KG/M2

## 2024-08-29 DIAGNOSIS — L30.4 ERYTHEMA INTERTRIGO: ICD-10-CM

## 2024-08-29 PROCEDURE — 99204 OFFICE O/P NEW MOD 45 MIN: CPT

## 2024-08-29 RX ORDER — MICONAZOLE NITRATE 20 MG/G
2 CREAM TOPICAL TWICE DAILY
Qty: 1 | Refills: 0 | Status: ACTIVE | COMMUNITY
Start: 2024-08-29 | End: 1900-01-01

## 2024-08-29 NOTE — ASSESSMENT
[FreeTextEntry1] : #Intertrigo avoid breast feeding while on medication take topical miconazole for 10 days bid keep area dry  Keep skin clean and dry. Do not share unwashed clothes, sports gear, or towels with other people. Wash with soap and shampoo after physical activity.. If no improvement after treatment return to office or see derm pt agrees and understands plan via teach back method. all questions answered. A total of 30 minutes including same day pre-visit chart review, face to face time with patient, history taking, physical examination, coordination of care, testing interpretation, and documentation was spent on this visit.

## 2024-08-29 NOTE — HEALTH RISK ASSESSMENT
[No] : No [No falls in past year] : Patient reported no falls in the past year [0] : 2) Feeling down, depressed, or hopeless: Not at all (0) [PHQ-2 Negative - No further assessment needed] : PHQ-2 Negative - No further assessment needed [[id number]] : 0 [Never] : Never

## 2024-08-29 NOTE — HISTORY OF PRESENT ILLNESS
[FreeTextEntry8] : 34 F LMP Aug 2023 currently has 3 month new born breast feeding c/o of rash under breast for past 3 weeks - no breast pain, no breast mass, no nipple discharge or rash on breast just located underneath breast folds L>R itchy, red, tried otc clotirmazole - mild releif  Pt reports the rash is slowly improving, however itchy  Not Spreading from initial area. Not associated w/ fever, arthralgias, URI symptoms, or other recent Viral syndromes. Precipitating factors: unknown No new medications. Denies any insect bites. No Recent Travel No recent gardening or contact with [employer]. Pt has no further complaints. Pt has no fufrther complaints

## 2024-08-29 NOTE — PHYSICAL EXAM
[No Acute Distress] : no acute distress [Normal Sclera/Conjunctiva] : normal sclera/conjunctiva [PERRL] : pupils equal round and reactive to light [EOMI] : extraocular movements intact [No Lymphadenopathy] : no lymphadenopathy [No Respiratory Distress] : no respiratory distress  [No Accessory Muscle Use] : no accessory muscle use [Normal Rate] : normal rate  [Regular Rhythm] : with a regular rhythm [Grossly Normal Strength/Tone] : grossly normal strength/tone [Coordination Grossly Intact] : coordination grossly intact [No Focal Deficits] : no focal deficits [Normal Gait] : normal gait [Deep Tendon Reflexes (DTR)] : deep tendon reflexes were 2+ and symmetric [Normal Affect] : the affect was normal [Normal Insight/Judgement] : insight and judgment were intact [de-identified] : face mask  [de-identified] : + Intertrigo under b/l breast

## 2024-09-11 ENCOUNTER — APPOINTMENT (OUTPATIENT)
Dept: INTERNAL MEDICINE | Facility: CLINIC | Age: 34
End: 2024-09-11
Payer: COMMERCIAL

## 2024-09-11 VITALS
SYSTOLIC BLOOD PRESSURE: 130 MMHG | DIASTOLIC BLOOD PRESSURE: 80 MMHG | HEART RATE: 70 BPM | WEIGHT: 163 LBS | OXYGEN SATURATION: 98 % | TEMPERATURE: 97.8 F | BODY MASS INDEX: 30 KG/M2 | HEIGHT: 62 IN

## 2024-09-11 DIAGNOSIS — M54.50 LOW BACK PAIN, UNSPECIFIED: ICD-10-CM

## 2024-09-11 PROCEDURE — 99213 OFFICE O/P EST LOW 20 MIN: CPT

## 2024-09-11 NOTE — ASSESSMENT
[FreeTextEntry1] : #Low Back Pain Chronic w/o radiculopathy no red flags Tylenol first line tx. currently breast feeding  Physical activity as tolerated, encouraged pt to stay active Reassured pain will most likely resolve in a few weeks Consider physical therapy f/u Pain managment  Lifestyle modification, good lifting techniques pt agrees and understands plan via teach back method. all questions answered.

## 2024-09-11 NOTE — PHYSICAL EXAM
[No Acute Distress] : no acute distress [Normal Sclera/Conjunctiva] : normal sclera/conjunctiva [PERRL] : pupils equal round and reactive to light [EOMI] : extraocular movements intact [Normal Outer Ear/Nose] : the outer ears and nose were normal in appearance [Normal Oropharynx] : the oropharynx was normal [No Lymphadenopathy] : no lymphadenopathy [No Respiratory Distress] : no respiratory distress  [No Accessory Muscle Use] : no accessory muscle use [Normal Rate] : normal rate  [Regular Rhythm] : with a regular rhythm [No Edema] : there was no peripheral edema [Soft] : abdomen soft [Non Tender] : non-tender [Non-distended] : non-distended [Normal Bowel Sounds] : normal bowel sounds [Normal Posterior Cervical Nodes] : no posterior cervical lymphadenopathy [Normal Anterior Cervical Nodes] : no anterior cervical lymphadenopathy [No CVA Tenderness] : no CVA  tenderness [No Spinal Tenderness] : no spinal tenderness [Grossly Normal Strength/Tone] : grossly normal strength/tone [Coordination Grossly Intact] : coordination grossly intact [No Focal Deficits] : no focal deficits [Normal Gait] : normal gait [Deep Tendon Reflexes (DTR)] : deep tendon reflexes were 2+ and symmetric [Normal Affect] : the affect was normal [Normal Insight/Judgement] : insight and judgment were intact [No Rash] : no rash [de-identified] : negative straight leg test bilaterally, left lower paraspinal mild pain on palpation Cephalexin Counseling: I counseled the patient regarding use of cephalexin as an antibiotic for prophylactic and/or therapeutic purposes. Cephalexin (commonly prescribed under brand name Keflex) is a cephalosporin antibiotic which is active against numerous classes of bacteria, including most skin bacteria. Side effects may include nausea, diarrhea, gastrointestinal upset, rash, hives, yeast infections, and in rare cases, hepatitis, kidney disease, seizures, fever, confusion, neurologic symptoms, and others. Patients with severe allergies to penicillin medications are cautioned that there is about a 10% incidence of cross-reactivity with cephalosporins. When possible, patients with penicillin allergies should use alternatives to cephalosporins for antibiotic therapy.

## 2024-09-11 NOTE — HEALTH RISK ASSESSMENT
[No] : No [No falls in past year] : Patient reported no falls in the past year [0] : 2) Feeling down, depressed, or hopeless: Not at all (0) [PHQ-2 Negative - No further assessment needed] : PHQ-2 Negative - No further assessment needed [Never] : Never [[id number]] : 0

## 2024-09-11 NOTE — HISTORY OF PRESENT ILLNESS
[FreeTextEntry8] : 34 F LMP: last aug 24, 2024 - no contraceptive, not trying to get pregnant currently breast feeding Patient c/o of chronic back pain 2 -3 years ago left lower side pt reports pain is 3/10 no radiation aggrevated by bending forward to  child alleviated by standing  no trauma or fall or injury reports got worse after recent pregnancy - (child was born may 21, [birth year]24) tylenol helps temporarly + heat w/ relief  Pt has no further complaints. Pt reports no numbness, tingling, weakness or loss of sensation.  No fever, no rash, no weakness or sensory changes No urinary retention or overflow incontinence, no bowel movement changes or saddle paresthesia. No trauma No history of cancer

## 2025-02-14 ENCOUNTER — APPOINTMENT (OUTPATIENT)
Age: 35
End: 2025-02-14

## 2025-03-05 ENCOUNTER — APPOINTMENT (OUTPATIENT)
Dept: DERMATOLOGY | Facility: CLINIC | Age: 35
End: 2025-03-05
Payer: COMMERCIAL

## 2025-03-05 PROCEDURE — 99203 OFFICE O/P NEW LOW 30 MIN: CPT

## 2025-03-24 ENCOUNTER — NON-APPOINTMENT (OUTPATIENT)
Age: 35
End: 2025-03-24

## 2025-03-24 ENCOUNTER — APPOINTMENT (OUTPATIENT)
Dept: CARDIOLOGY | Facility: CLINIC | Age: 35
End: 2025-03-24
Payer: COMMERCIAL

## 2025-03-24 VITALS
RESPIRATION RATE: 16 BRPM | SYSTOLIC BLOOD PRESSURE: 144 MMHG | BODY MASS INDEX: 29.81 KG/M2 | WEIGHT: 162 LBS | DIASTOLIC BLOOD PRESSURE: 87 MMHG | HEIGHT: 62 IN | TEMPERATURE: 98 F | OXYGEN SATURATION: 98 % | HEART RATE: 70 BPM

## 2025-03-24 PROCEDURE — 93000 ELECTROCARDIOGRAM COMPLETE: CPT

## 2025-03-24 PROCEDURE — 99214 OFFICE O/P EST MOD 30 MIN: CPT

## 2025-03-24 RX ORDER — NORETHINDRONE 0.35 MG/1
0.35 TABLET ORAL
Refills: 0 | Status: ACTIVE | COMMUNITY